# Patient Record
Sex: FEMALE | Race: WHITE | NOT HISPANIC OR LATINO
[De-identification: names, ages, dates, MRNs, and addresses within clinical notes are randomized per-mention and may not be internally consistent; named-entity substitution may affect disease eponyms.]

---

## 2017-01-04 ENCOUNTER — APPOINTMENT (OUTPATIENT)
Dept: ORTHOPEDIC SURGERY | Facility: CLINIC | Age: 47
End: 2017-01-04

## 2017-01-04 VITALS — SYSTOLIC BLOOD PRESSURE: 148 MMHG | HEART RATE: 85 BPM | DIASTOLIC BLOOD PRESSURE: 94 MMHG

## 2017-01-04 VITALS — BODY MASS INDEX: 21.92 KG/M2 | HEIGHT: 69 IN | WEIGHT: 148 LBS

## 2017-01-04 DIAGNOSIS — K52.9 NONINFECTIVE GASTROENTERITIS AND COLITIS, UNSPECIFIED: ICD-10-CM

## 2017-01-04 DIAGNOSIS — Z82.62 FAMILY HISTORY OF OSTEOPOROSIS: ICD-10-CM

## 2017-01-04 DIAGNOSIS — Z56.0 UNEMPLOYMENT, UNSPECIFIED: ICD-10-CM

## 2017-01-04 DIAGNOSIS — Z86.39 PERSONAL HISTORY OF OTHER ENDOCRINE, NUTRITIONAL AND METABOLIC DISEASE: ICD-10-CM

## 2017-01-04 DIAGNOSIS — Z78.9 OTHER SPECIFIED HEALTH STATUS: ICD-10-CM

## 2017-01-04 DIAGNOSIS — Z84.89 FAMILY HISTORY OF OTHER SPECIFIED CONDITIONS: ICD-10-CM

## 2017-01-04 DIAGNOSIS — Z87.39 PERSONAL HISTORY OF OTHER DISEASES OF THE MUSCULOSKELETAL SYSTEM AND CONNECTIVE TISSUE: ICD-10-CM

## 2017-01-04 RX ORDER — LAMOTRIGINE 25 MG/1
TABLET ORAL
Refills: 0 | Status: ACTIVE | COMMUNITY

## 2017-01-04 SDOH — ECONOMIC STABILITY - INCOME SECURITY: UNEMPLOYMENT, UNSPECIFIED: Z56.0

## 2018-03-12 ENCOUNTER — APPOINTMENT (OUTPATIENT)
Dept: ORTHOPEDIC SURGERY | Facility: CLINIC | Age: 48
End: 2018-03-12
Payer: COMMERCIAL

## 2018-03-12 DIAGNOSIS — M41.9 SCOLIOSIS, UNSPECIFIED: ICD-10-CM

## 2018-03-12 PROCEDURE — 99214 OFFICE O/P EST MOD 30 MIN: CPT

## 2018-03-12 PROCEDURE — 72082 X-RAY EXAM ENTIRE SPI 2/3 VW: CPT

## 2019-03-13 ENCOUNTER — APPOINTMENT (OUTPATIENT)
Dept: INTERNAL MEDICINE | Facility: CLINIC | Age: 49
End: 2019-03-13

## 2019-11-25 ENCOUNTER — RESULT REVIEW (OUTPATIENT)
Age: 49
End: 2019-11-25

## 2020-02-14 ENCOUNTER — TRANSCRIPTION ENCOUNTER (OUTPATIENT)
Age: 50
End: 2020-02-14

## 2020-02-14 ENCOUNTER — APPOINTMENT (OUTPATIENT)
Dept: ULTRASOUND IMAGING | Facility: CLINIC | Age: 50
End: 2020-02-14

## 2020-12-04 ENCOUNTER — TRANSCRIPTION ENCOUNTER (OUTPATIENT)
Age: 50
End: 2020-12-04

## 2022-06-05 ENCOUNTER — EMERGENCY (EMERGENCY)
Facility: HOSPITAL | Age: 52
LOS: 0 days | Discharge: HOME | End: 2022-06-06
Attending: EMERGENCY MEDICINE
Payer: COMMERCIAL

## 2022-06-05 VITALS — WEIGHT: 225.09 LBS | HEIGHT: 71 IN

## 2022-06-05 VITALS
TEMPERATURE: 99 F | RESPIRATION RATE: 18 BRPM | DIASTOLIC BLOOD PRESSURE: 83 MMHG | SYSTOLIC BLOOD PRESSURE: 121 MMHG | OXYGEN SATURATION: 99 % | HEART RATE: 75 BPM

## 2022-06-05 DIAGNOSIS — S91.011A LACERATION WITHOUT FOREIGN BODY, RIGHT ANKLE, INITIAL ENCOUNTER: ICD-10-CM
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
## 2022-06-05 DIAGNOSIS — S81.811A LACERATION WITHOUT FOREIGN BODY, RIGHT LOWER LEG, INITIAL ENCOUNTER: ICD-10-CM
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
## 2022-06-05 PROCEDURE — 12001 RPR S/N/AX/GEN/TRNK 2.5CM/<: CPT

## 2022-06-05 PROCEDURE — 99283 EMERGENCY DEPT VISIT LOW MDM: CPT | Mod: 25

## 2022-06-05 NOTE — ED STATDOCS - PATIENT PORTAL LINK FT
You can access the FollowMyHealth Patient Portal offered by [facility] by registering at the following website: [email or web address]. By joining [facility]’s FollowMyHealth portal, you will also be able to view your health information using other applications (apps) compatible with our system.

## 2022-06-05 NOTE — ED STATDOCS - SKIN, MLM
skin normal color for race, warm, dry and intact. jagged lac approx 2x2cm to medial R distal lower leg

## 2022-06-05 NOTE — ED STATDOCS - OBJECTIVE STATEMENT
was in garage getting something and chainsaw fell off the wall and fell onto R ankle, p/w lac.  no further injury

## 2022-06-05 NOTE — ED STATDOCS - NSFOLLOWUPINSTRUCTIONS_ED_ALL_ED_FT
Keep wound clean and dry  suture removal in 10 days  any sign of infection return right away    Laceration    WHAT YOU NEED TO KNOW:    A laceration is an injury to the skin and the soft tissue underneath it. Lacerations happen when you are cut or hit by something. They can happen anywhere on the body.     DISCHARGE INSTRUCTIONS:    Return to the emergency department if:     You have heavy bleeding or bleeding that does not stop after 10 minutes of holding firm, direct pressure over the wound.       Your wound opens up.     Contact your healthcare provider if:     You have a fever or chills.       Your laceration is red, warm, or swollen.      You have red streaks on your skin coming from your wound.      You have white or yellow drainage from the wound that smells bad.      You have pain that gets worse, even after treatment.       You have questions or concerns about your condition or care.     Medicines:     Prescription pain medicine may be given. Ask how to take this medicine safely.       Antibiotics help treat or prevent a bacterial infection.       Take your medicine as directed. Contact your healthcare provider if you think your medicine is not helping or if you have side effects. Tell him or her if you are allergic to any medicine. Keep a list of the medicines, vitamins, and herbs you take. Include the amounts, and when and why you take them. Bring the list or the pill bottles to follow-up visits. Carry your medicine list with you in case of an emergency.    Care for your wound as directed:     Do not get your wound wet until your healthcare provider says it is okay. Do not soak your wound in water. Do not go swimming until your healthcare provider says it is okay. Carefully wash the wound with soap and water. Gently pat the area dry or allow it to air dry.       Change your bandages when they get wet, dirty, or after washing. Apply new, clean bandages as directed. Do not apply elastic bandages or tape too tight. Do not put powders or lotions over your incision.       Apply antibiotic ointment as directed. Your healthcare provider may give you antibiotic ointment to put over your wound if you have stitches. If you have strips of tape over your incision, let them dry up and fall off on their own. If they do not fall off within 14 days, gently remove them. If you have glue over your wound, do not remove or pick at it. If your glue comes off, do not replace it with glue that you have at home.       Check your wound every day for signs of infection such as swelling, redness, or pus.     Self-care:     Apply ice on your wound for 15 to 20 minutes every hour or as directed. Use an ice pack, or put crushed ice in a plastic bag. Cover it with a towel. Ice helps prevent tissue damage and decreases swelling and pain.      Use a splint as directed. A splint will decrease movement and stress on your wound. It may help it heal faster. A splint may be used for lacerations over joints or areas of your body that bend. Ask your healthcare provider how to apply and remove a splint.       Decrease scarring of your wound by applying ointments as directed. Do not apply ointments until your healthcare provider says it is okay. You may need to wait until your wound is healed. Ask which ointment to buy and how often to use it. After your wound is healed, use sunscreen over the area when you are out in the sun. You should do this for at least 6 months to 1 year after your injury.     Follow up with your healthcare provider as directed: You may need to follow up in 24 to 48 hours to have your wound checked for infection. You will need to return in 3 to 14 days if you have stitches or staples so they can be removed. Care for your wound as directed to prevent infection and help it heal. Write down your questions so you remember to ask them during your visits.

## 2022-06-05 NOTE — ED STATDOCS - ENMT, MLM
Nasal mucosa clear.  Mouth with normal mucosa  Throat has no vesicles, no oropharyngeal exudates and uvula is midline.
Yes

## 2022-06-06 ENCOUNTER — INPATIENT (INPATIENT)
Facility: HOSPITAL | Age: 52
LOS: 1 days | Discharge: HOME | DRG: 149 | End: 2022-06-08
Attending: HOSPITALIST | Admitting: FAMILY MEDICINE
Payer: COMMERCIAL

## 2022-06-06 ENCOUNTER — TRANSCRIPTION ENCOUNTER (OUTPATIENT)
Age: 52
End: 2022-06-06

## 2022-06-06 VITALS
RESPIRATION RATE: 19 BRPM | TEMPERATURE: 98 F | SYSTOLIC BLOOD PRESSURE: 153 MMHG | HEIGHT: 71 IN | OXYGEN SATURATION: 100 % | DIASTOLIC BLOOD PRESSURE: 101 MMHG | WEIGHT: 160.06 LBS | HEART RATE: 81 BPM

## 2022-06-06 DIAGNOSIS — R27.0 ATAXIA, UNSPECIFIED: ICD-10-CM

## 2022-06-06 DIAGNOSIS — Z87.39 PERSONAL HISTORY OF OTHER DISEASES OF THE MUSCULOSKELETAL SYSTEM AND CONNECTIVE TISSUE: Chronic | ICD-10-CM

## 2022-06-06 LAB
ADD ON TEST-SPECIMEN IN LAB: SIGNIFICANT CHANGE UP
ALBUMIN SERPL ELPH-MCNC: 3.5 G/DL — SIGNIFICANT CHANGE UP (ref 3.3–5)
ALP SERPL-CCNC: 88 U/L — SIGNIFICANT CHANGE UP (ref 40–120)
ALT FLD-CCNC: 37 U/L — SIGNIFICANT CHANGE UP (ref 12–78)
ANION GAP SERPL CALC-SCNC: 8 MMOL/L — SIGNIFICANT CHANGE UP (ref 5–17)
APAP SERPL-MCNC: <2 UG/ML — LOW (ref 10–30)
APPEARANCE UR: CLEAR — SIGNIFICANT CHANGE UP
AST SERPL-CCNC: 24 U/L — SIGNIFICANT CHANGE UP (ref 15–37)
BASOPHILS # BLD AUTO: 0.02 K/UL — SIGNIFICANT CHANGE UP (ref 0–0.2)
BASOPHILS NFR BLD AUTO: 0.3 % — SIGNIFICANT CHANGE UP (ref 0–2)
BILIRUB SERPL-MCNC: 0.3 MG/DL — SIGNIFICANT CHANGE UP (ref 0.2–1.2)
BILIRUB UR-MCNC: NEGATIVE — SIGNIFICANT CHANGE UP
BUN SERPL-MCNC: 25 MG/DL — HIGH (ref 7–23)
CALCIUM SERPL-MCNC: 9.8 MG/DL — SIGNIFICANT CHANGE UP (ref 8.5–10.1)
CHLORIDE SERPL-SCNC: 109 MMOL/L — HIGH (ref 96–108)
CO2 SERPL-SCNC: 24 MMOL/L — SIGNIFICANT CHANGE UP (ref 22–31)
COLOR SPEC: YELLOW — SIGNIFICANT CHANGE UP
CREAT SERPL-MCNC: 0.94 MG/DL — SIGNIFICANT CHANGE UP (ref 0.5–1.3)
DIFF PNL FLD: NEGATIVE — SIGNIFICANT CHANGE UP
EGFR: 73 ML/MIN/1.73M2 — SIGNIFICANT CHANGE UP
EOSINOPHIL # BLD AUTO: 0.08 K/UL — SIGNIFICANT CHANGE UP (ref 0–0.5)
EOSINOPHIL NFR BLD AUTO: 1.1 % — SIGNIFICANT CHANGE UP (ref 0–6)
ETHANOL SERPL-MCNC: <10 MG/DL — SIGNIFICANT CHANGE UP (ref 0–10)
GLUCOSE SERPL-MCNC: 103 MG/DL — HIGH (ref 70–99)
GLUCOSE UR QL: NEGATIVE — SIGNIFICANT CHANGE UP
HCT VFR BLD CALC: 37.2 % — SIGNIFICANT CHANGE UP (ref 34.5–45)
HGB BLD-MCNC: 11.9 G/DL — SIGNIFICANT CHANGE UP (ref 11.5–15.5)
IMM GRANULOCYTES NFR BLD AUTO: 0.3 % — SIGNIFICANT CHANGE UP (ref 0–1.5)
KETONES UR-MCNC: NEGATIVE — SIGNIFICANT CHANGE UP
LEUKOCYTE ESTERASE UR-ACNC: NEGATIVE — SIGNIFICANT CHANGE UP
LYMPHOCYTES # BLD AUTO: 1.26 K/UL — SIGNIFICANT CHANGE UP (ref 1–3.3)
LYMPHOCYTES # BLD AUTO: 18 % — SIGNIFICANT CHANGE UP (ref 13–44)
MCHC RBC-ENTMCNC: 30.2 PG — SIGNIFICANT CHANGE UP (ref 27–34)
MCHC RBC-ENTMCNC: 32 GM/DL — SIGNIFICANT CHANGE UP (ref 32–36)
MCV RBC AUTO: 94.4 FL — SIGNIFICANT CHANGE UP (ref 80–100)
MONOCYTES # BLD AUTO: 0.43 K/UL — SIGNIFICANT CHANGE UP (ref 0–0.9)
MONOCYTES NFR BLD AUTO: 6.1 % — SIGNIFICANT CHANGE UP (ref 2–14)
NEUTROPHILS # BLD AUTO: 5.2 K/UL — SIGNIFICANT CHANGE UP (ref 1.8–7.4)
NEUTROPHILS NFR BLD AUTO: 74.2 % — SIGNIFICANT CHANGE UP (ref 43–77)
NITRITE UR-MCNC: NEGATIVE — SIGNIFICANT CHANGE UP
PCP SPEC-MCNC: SIGNIFICANT CHANGE UP
PH UR: 5 — SIGNIFICANT CHANGE UP (ref 5–8)
PLATELET # BLD AUTO: 326 K/UL — SIGNIFICANT CHANGE UP (ref 150–400)
POTASSIUM SERPL-MCNC: 4.4 MMOL/L — SIGNIFICANT CHANGE UP (ref 3.5–5.3)
POTASSIUM SERPL-SCNC: 4.4 MMOL/L — SIGNIFICANT CHANGE UP (ref 3.5–5.3)
PROT SERPL-MCNC: 7.4 GM/DL — SIGNIFICANT CHANGE UP (ref 6–8.3)
PROT UR-MCNC: NEGATIVE — SIGNIFICANT CHANGE UP
RBC # BLD: 3.94 M/UL — SIGNIFICANT CHANGE UP (ref 3.8–5.2)
RBC # FLD: 14 % — SIGNIFICANT CHANGE UP (ref 10.3–14.5)
SALICYLATES SERPL-MCNC: <1.7 MG/DL — LOW (ref 2.8–20)
SARS-COV-2 RNA SPEC QL NAA+PROBE: SIGNIFICANT CHANGE UP
SODIUM SERPL-SCNC: 141 MMOL/L — SIGNIFICANT CHANGE UP (ref 135–145)
SP GR SPEC: 1.01 — SIGNIFICANT CHANGE UP (ref 1.01–1.02)
UROBILINOGEN FLD QL: NEGATIVE — SIGNIFICANT CHANGE UP
WBC # BLD: 7.01 K/UL — SIGNIFICANT CHANGE UP (ref 3.8–10.5)
WBC # FLD AUTO: 7.01 K/UL — SIGNIFICANT CHANGE UP (ref 3.8–10.5)

## 2022-06-06 PROCEDURE — 99285 EMERGENCY DEPT VISIT HI MDM: CPT

## 2022-06-06 PROCEDURE — 83036 HEMOGLOBIN GLYCOSYLATED A1C: CPT

## 2022-06-06 PROCEDURE — 93010 ELECTROCARDIOGRAM REPORT: CPT

## 2022-06-06 PROCEDURE — 84702 CHORIONIC GONADOTROPIN TEST: CPT

## 2022-06-06 PROCEDURE — 70551 MRI BRAIN STEM W/O DYE: CPT

## 2022-06-06 PROCEDURE — 70498 CT ANGIOGRAPHY NECK: CPT | Mod: 26,MA

## 2022-06-06 PROCEDURE — 71045 X-RAY EXAM CHEST 1 VIEW: CPT | Mod: 26

## 2022-06-06 PROCEDURE — 85652 RBC SED RATE AUTOMATED: CPT

## 2022-06-06 PROCEDURE — 80061 LIPID PANEL: CPT

## 2022-06-06 PROCEDURE — 70496 CT ANGIOGRAPHY HEAD: CPT | Mod: 26,MA

## 2022-06-06 PROCEDURE — 80048 BASIC METABOLIC PNL TOTAL CA: CPT

## 2022-06-06 PROCEDURE — 99223 1ST HOSP IP/OBS HIGH 75: CPT

## 2022-06-06 PROCEDURE — 71045 X-RAY EXAM CHEST 1 VIEW: CPT

## 2022-06-06 PROCEDURE — 86140 C-REACTIVE PROTEIN: CPT

## 2022-06-06 PROCEDURE — 97163 PT EVAL HIGH COMPLEX 45 MIN: CPT | Mod: GP

## 2022-06-06 PROCEDURE — 97116 GAIT TRAINING THERAPY: CPT | Mod: GP

## 2022-06-06 PROCEDURE — 70450 CT HEAD/BRAIN W/O DYE: CPT | Mod: 26,MA,59

## 2022-06-06 PROCEDURE — 36415 COLL VENOUS BLD VENIPUNCTURE: CPT

## 2022-06-06 RX ORDER — VORTIOXETINE 5 MG/1
1 TABLET, FILM COATED ORAL
Qty: 0 | Refills: 0 | DISCHARGE

## 2022-06-06 RX ORDER — LANOLIN ALCOHOL/MO/W.PET/CERES
1 CREAM (GRAM) TOPICAL
Qty: 0 | Refills: 0 | DISCHARGE

## 2022-06-06 RX ORDER — ACEBUTOLOL HCL 200 MG
1 CAPSULE ORAL
Qty: 0 | Refills: 0 | DISCHARGE

## 2022-06-06 RX ORDER — LANOLIN ALCOHOL/MO/W.PET/CERES
10 CREAM (GRAM) TOPICAL AT BEDTIME
Refills: 0 | Status: DISCONTINUED | OUTPATIENT
Start: 2022-06-06 | End: 2022-06-08

## 2022-06-06 RX ORDER — HYDROXYZINE HCL 10 MG
2 TABLET ORAL
Qty: 0 | Refills: 0 | DISCHARGE

## 2022-06-06 RX ORDER — CLONAZEPAM 1 MG
1 TABLET ORAL
Qty: 0 | Refills: 0 | DISCHARGE

## 2022-06-06 RX ORDER — ASPIRIN/CALCIUM CARB/MAGNESIUM 324 MG
325 TABLET ORAL ONCE
Refills: 0 | Status: COMPLETED | OUTPATIENT
Start: 2022-06-06 | End: 2022-06-06

## 2022-06-06 RX ORDER — ZOLPIDEM TARTRATE 10 MG/1
5 TABLET ORAL AT BEDTIME
Refills: 0 | Status: DISCONTINUED | OUTPATIENT
Start: 2022-06-06 | End: 2022-06-08

## 2022-06-06 RX ORDER — QUETIAPINE FUMARATE 200 MG/1
50 TABLET, FILM COATED ORAL AT BEDTIME
Refills: 0 | Status: DISCONTINUED | OUTPATIENT
Start: 2022-06-06 | End: 2022-06-08

## 2022-06-06 RX ORDER — CLONAZEPAM 1 MG
0.5 TABLET ORAL
Refills: 0 | Status: DISCONTINUED | OUTPATIENT
Start: 2022-06-06 | End: 2022-06-08

## 2022-06-06 RX ORDER — ENOXAPARIN SODIUM 100 MG/ML
30 INJECTION SUBCUTANEOUS EVERY 24 HOURS
Refills: 0 | Status: DISCONTINUED | OUTPATIENT
Start: 2022-06-06 | End: 2022-06-07

## 2022-06-06 RX ORDER — CLONAZEPAM 1 MG
2 TABLET ORAL
Qty: 0 | Refills: 0 | DISCHARGE

## 2022-06-06 RX ORDER — NICOTINE POLACRILEX 2 MG
1 GUM BUCCAL DAILY
Refills: 0 | Status: DISCONTINUED | OUTPATIENT
Start: 2022-06-06 | End: 2022-06-08

## 2022-06-06 RX ORDER — ASPIRIN/CALCIUM CARB/MAGNESIUM 324 MG
81 TABLET ORAL DAILY
Refills: 0 | Status: DISCONTINUED | OUTPATIENT
Start: 2022-06-07 | End: 2022-06-08

## 2022-06-06 RX ORDER — ZOLPIDEM TARTRATE 10 MG/1
1 TABLET ORAL
Qty: 0 | Refills: 0 | DISCHARGE

## 2022-06-06 RX ORDER — ACEBUTOLOL HCL 200 MG
200 CAPSULE ORAL AT BEDTIME
Refills: 0 | Status: DISCONTINUED | OUTPATIENT
Start: 2022-06-06 | End: 2022-06-08

## 2022-06-06 RX ORDER — LAMOTRIGINE 25 MG/1
200 TABLET, ORALLY DISINTEGRATING ORAL AT BEDTIME
Refills: 0 | Status: DISCONTINUED | OUTPATIENT
Start: 2022-06-06 | End: 2022-06-07

## 2022-06-06 RX ORDER — ROSUVASTATIN CALCIUM 5 MG/1
1 TABLET ORAL
Qty: 0 | Refills: 0 | DISCHARGE

## 2022-06-06 RX ORDER — SODIUM CHLORIDE 9 MG/ML
1000 INJECTION INTRAMUSCULAR; INTRAVENOUS; SUBCUTANEOUS ONCE
Refills: 0 | Status: COMPLETED | OUTPATIENT
Start: 2022-06-06 | End: 2022-06-06

## 2022-06-06 RX ORDER — ATORVASTATIN CALCIUM 80 MG/1
80 TABLET, FILM COATED ORAL AT BEDTIME
Refills: 0 | Status: DISCONTINUED | OUTPATIENT
Start: 2022-06-06 | End: 2022-06-08

## 2022-06-06 RX ORDER — LAMOTRIGINE 25 MG/1
2 TABLET, ORALLY DISINTEGRATING ORAL
Qty: 0 | Refills: 0 | DISCHARGE

## 2022-06-06 RX ORDER — CLONAZEPAM 1 MG
1 TABLET ORAL AT BEDTIME
Refills: 0 | Status: DISCONTINUED | OUTPATIENT
Start: 2022-06-06 | End: 2022-06-08

## 2022-06-06 RX ORDER — HYDROXYZINE HCL 10 MG
25 TABLET ORAL DAILY
Refills: 0 | Status: DISCONTINUED | OUTPATIENT
Start: 2022-06-06 | End: 2022-06-08

## 2022-06-06 RX ADMIN — LAMOTRIGINE 200 MILLIGRAM(S): 25 TABLET, ORALLY DISINTEGRATING ORAL at 23:07

## 2022-06-06 RX ADMIN — Medication 325 MILLIGRAM(S): at 18:12

## 2022-06-06 RX ADMIN — QUETIAPINE FUMARATE 50 MILLIGRAM(S): 200 TABLET, FILM COATED ORAL at 23:29

## 2022-06-06 RX ADMIN — Medication 1 MILLIGRAM(S): at 23:07

## 2022-06-06 RX ADMIN — Medication 10 MILLIGRAM(S): at 23:07

## 2022-06-06 RX ADMIN — ENOXAPARIN SODIUM 30 MILLIGRAM(S): 100 INJECTION SUBCUTANEOUS at 23:29

## 2022-06-06 RX ADMIN — Medication 1 TABLET(S): at 23:07

## 2022-06-06 RX ADMIN — SODIUM CHLORIDE 1000 MILLILITER(S): 9 INJECTION INTRAMUSCULAR; INTRAVENOUS; SUBCUTANEOUS at 15:41

## 2022-06-06 NOTE — PATIENT PROFILE ADULT - FUNCTIONAL ASSESSMENT - DAILY ACTIVITY SCORE.
Principal Discharge DX:	Hip fracture requiring operative repair, right, closed, initial encounter  Goal:	Restore function  Assessment and plan of treatment:	Your Physical Therapy /Occupational Therapy will include Ambulation, Transfers , Stairs, ADLs (activities of daily living), range of motion, and isometrics.  Your participation is vital for the fullest recovery and best results.  You may bear full weight as tolerated with rolling walker, cane or assistive device.     HIP PRECAUTIONS   Do not bend your hip more than 90 degrees.   Do not rotate your leg drastically inward.   Continue abduction pillow while in bed.   Sit in Hip Chair or a chair that does not allow your to bend more than 90 degrees.  Do not sit on low chairs or low toilet seats.  Do not take a tub bath yet.   Do not resume driving until you have your surgeon’s permission.     Keep incision clean and dry.  Change the dressing daily if there is drainage noted.  When there is no drainage the wound may be open to air.   The wound is closed with staples, which should be removed 2 weeks after surgery at rehab facility or in surgeon's office.  If there is no wet drainage you may shower and pat dry with a clean towel. Principal Discharge DX:	Hip fracture requiring operative repair, right, closed, initial encounter  Goal:	Restore function  Assessment and plan of treatment:	Your Physical Therapy /Occupational Therapy will include Ambulation, Transfers , Stairs, ADLs (activities of daily living), range of motion, and isometrics.  Your participation is vital for the fullest recovery and best results.  You may bear full weight as tolerated with rolling walker, cane or assistive device.     HIP PRECAUTIONS   Do not bend your hip more than 90 degrees.   Do not rotate your leg drastically inward.   Continue abduction pillow while in bed.   Sit in Hip Chair or a chair that does not allow your to bend more than 90 degrees.  Do not sit on low chairs or low toilet seats.  Do not take a tub bath yet.   Do not resume driving until you have your surgeon’s permission.     Keep incision clean and dry.  Change the dressing daily if there is drainage noted.  When there is no drainage the wound may be open to air.   The wound is closed with staples, which should be removed 2 weeks after surgery at rehab facility or in surgeon's office.  If there is no wet drainage you may shower and pat dry with a clean towel.  Secondary Diagnosis:	Acute cystitis without hematuria  Assessment and plan of treatment:	esbl ecoli.   invanz 1gm iv x7 days started jan 23  Secondary Diagnosis:	Urine retention  Assessment and plan of treatment:	failed TOV, probably retry next week. 23

## 2022-06-06 NOTE — H&P ADULT - HISTORY OF PRESENT ILLNESS
53 y/o female with a PMHx of bipolar, depression, presents to the ED c/o generalized weakness, difficulty walking. Pt was seen in ED last night for ankle laceration. Pt was here until 1230 AM.  States she went to sleep around 1 AM. Pt slept until 9 AM, woke up with sensation of generalized weakness and difficulty ambulating secondary to weakness. States she also had an episode of vomiting today.  States she attempted to go back to sleep, and woke up later and still had difficulty standing/walking.  Pt sates that her father passed away 6 months ago and she was cleaning his house yesterday. Pt took her prescription Lamictal today. Denies SI/HI, hallucinations. No EtOH use. No drug use. Pt is a pleasant 53 y/o female with a PMHx of Bipolar depression,  Sleep Disorder, Sinus Arrythmia with PVCs, Collagenous microscopic colitis and HLA B27,   Scoliosis, Osteoporosis who  presented to  [facility] ED c/o generalized weakness, and difficulty walking since 9am today.  Notably, pt  was seen at [facility] ED last night for ankle laceration while she was cleaning out her recently  father's garage.  Last night,  pt was here until 1230 AM and reported  she went to sleep around 1 AM.    She woke up at 9am and reported a  generalized weakness of her limbs with associated difficulty ambulating.    She reports she had to slowly crawl from bedpost to the bathroom and back in order to keep from falling.   She c/o dizziness and feeling as though she was "spinning".   Pt reports a similar episode occurred 2 months ago and self resolved after rest and sleep.    This time though she tried to go back to sleep.  But when she  woke  later, she still had difficulty standing/walking.  She reports inability to think clearly and in the ED,  pt was notably very tearful  and weeping when evaluated by ED staff.  Pt denied SI/HI, no hallucinations.      Pt c/o HA which is all over her head but reportedly more in the front part of her head.  In the ED her symptoms of weakness improved enough that she was able to sit up and  eat a late dinner.  She denies chest pain, palpitations.   No abd pain or resp complaints.  She had some loose stools yesterday which she reports as chronic and intermittent,   she also had an episode of vomiting today.   No urinary complaints.    She vapes several times a day and says it helps her with her chronic colitis.

## 2022-06-06 NOTE — H&P ADULT - NSHPPHYSICALEXAM_GEN_ALL_CORE
ICU Vital Signs Last 24 Hrs  T(C): 36.9 (06 Jun 2022 20:30), Max: 37 (05 Jun 2022 22:38)  T(F): 98.4 (06 Jun 2022 20:30), Max: 98.6 (05 Jun 2022 22:38)  HR: 74 (06 Jun 2022 20:30) (70 - 81)  BP: 134/74 (06 Jun 2022 20:30) (121/83 - 154/90)  BP(mean): 109 (06 Jun 2022 15:27) (95 - 109)    RR: 12 (06 Jun 2022 20:30) (12 - 19)  SpO2: 98% (06 Jun 2022 20:30) (97% - 100%)

## 2022-06-06 NOTE — ED ADULT NURSE NOTE - OBJECTIVE STATEMENT
pt presents to er for evaluation of weakness. pt anxious and tearful upon arrival, stating "my father  6months ago. I went to his house yesterday to clean it out and it's been extremely emotional i'm so sad. I could barely get out of bed today". pt denies SI/HI. hx depression. no CP, fevers, SOB, urinary symptoms.

## 2022-06-06 NOTE — H&P ADULT - NSICDXPASTMEDICALHX_GEN_ALL_CORE_FT
PAST MEDICAL HISTORY:  Bipolar disorder     Depression     Scoliosis      PAST MEDICAL HISTORY:  Bipolar disorder     Collagenous colitis     Depression     HLA B27 positive     Osteoporosis     Scoliosis

## 2022-06-06 NOTE — H&P ADULT - NSICDXFAMILYHX_GEN_ALL_CORE_FT
FAMILY HISTORY:  Family history of autoimmune disorder, HLA-B27    Mother  Still living? Unknown  Family history of myocardial infarction, Age at diagnosis: Age Unknown

## 2022-06-06 NOTE — ED PROVIDER NOTE - CONSTITUTIONAL, MLM
Tearful, awake, alert, oriented to person, place, time/situation and in no apparent distress. normal...

## 2022-06-06 NOTE — ED PROVIDER NOTE - CROS ED ROS STATEMENT
Post-Care Instructions: I reviewed with the patient in detail post-care instructions. Patient is to wear sunprotection, and avoid picking at any of the treated lesions. Pt may apply Vaseline to crusted or scabbing areas.
Detail Level: Zone
Total Number Of Lesions Treated: 26
Price (Use Numbers Only, No Special Characters Or $): 150.00
Duration Of Freeze Thaw-Cycle (Seconds): 0
Render Post Care In The Note?: yes
Consent: The patient's consent was obtained including but not limited to risks of crusting, scabbing, blistering, scarring, darker or lighter pigmentary change, recurrence, incomplete removal and infection.
all other ROS negative except as per HPI

## 2022-06-06 NOTE — ED PROVIDER NOTE - PROGRESS NOTE DETAILS
pt here with c/c of overslept, very emotional, crying at bedside b/c she is selling her Dad's place this week and she was there yesterday cleaning it up. pt denies any SI/HI. PE: wnl plan: labs, tox screen, ekg, cardiac monitor and reeval. -Ivett Villeda PA-C Although exam in bed unremarkable, nurse got patient up to walk and patient dystaxic.  Last known normal 1 AM today, outside of tPA window.  Negative workup so far, (patient does take Lamictal, possible medication side effect, however lamictal level is send out).  Adding CTA to r/o LVO.  Likely admit for difficulty ambulating. Andrés Keller D.O.

## 2022-06-06 NOTE — ED ADULT TRIAGE NOTE - CHIEF COMPLAINT QUOTE
Patient presents with  via EMS. Patient states they were here this morning until 2 am getting suture repair. Patient woke up this morning with generalized weakness. Can move all extremities. Patient tearful at triage.

## 2022-06-06 NOTE — H&P ADULT - REASON FOR ADMISSION
Possible CVA/TIA    Headache   Ataxia/gait disorder  and Generalized weakness  Possible medication toxicity

## 2022-06-06 NOTE — ED PROVIDER NOTE - NEUROLOGICAL, MLM
Alert and oriented, no focal deficits, no motor or sensory deficits. Alert and oriented, no focal deficits, no motor or sensory deficits; no drift, no dysarthria or dysmetria; does have ataxic gait.  NIH 2 for ataxia.

## 2022-06-06 NOTE — ED ADULT NURSE REASSESSMENT NOTE - NS ED NURSE REASSESS COMMENT FT1
pt ambulated to bathroom with 1 assist, still unsteady but walked much better than previously as per  @bedside.  no abnormalities on tele monitor, awaiting inpatient room upstairs.

## 2022-06-06 NOTE — ED PROVIDER NOTE - OBJECTIVE STATEMENT
53 y/o female with a PMHx of bipolar, depression, presents to the ED c/o generalized weakness. Pt was seen in ED last night for ankle laceration. Pt was here until 2am. Pt slept until noon today. Pt with generalized weakness and difficulty ambulating secondary to weakness. Pt sates that her father passed away 6 months ago and she was cleaning his house yesterday. Pt took her prescription Lamictal today. Denies SI/HI, hallucinations. No EtOH use. No drug use. No other complaints at this time. 53 y/o female with a PMHx of bipolar, depression, presents to the ED c/o generalized weakness, difficulty walking. Pt was seen in ED last night for ankle laceration. Pt was here until 1230 AM.  States she went to sleep around 1 AM. Pt slept until 9 AM, woke up with sensation of generalized weakness and difficulty ambulating secondary to weakness. States she also had an episode of vomiting today.  States she attempted to go back to sleep, and woke up later and still had difficulty standing/walking.  Pt sates that her father passed away 6 months ago and she was cleaning his house yesterday. Pt took her prescription Lamictal today. Denies SI/HI, hallucinations. No EtOH use. No drug use. No other complaints at this time.

## 2022-06-06 NOTE — H&P ADULT - ASSESSMENT
Pt is admitted w/    Possible CVA/TIA  . NIHSS:0  Headache   Ataxia/gait disorder  and Generalized weakness  Possible medication toxicity  Hx of Bipolar Depression  Bereavement,   Hx of Collagenous microscopic colitis and HLA B27  Hx of Scoliosis,  pt has been sleeping in a lounge chair for 2 years  Vaping Hx,   - s/p CT brain  - s/p ASA 325mg in the ED  - Lamictal level pending  - s/p 1 L NS in the ED  - continue Lamictal, seroquel and Trintilix for now  - MRI brain  - Neurology and Psychiatry consults   - Falls risk  - physical therapy  - smoking cessation,  low dose nicotine patch trial  - DVT proph: lovenox  - Adv Dir: Full Code Pt is admitted w/    Possible CVA/TIA  . NIHSS:0  Headache   Ataxia/gait disorder  and Generalized weakness  Possible medication toxicity  Hx of Bipolar Depression  Bereavement,   Hx of Collagenous microscopic colitis and HLA B27  Hx of Scoliosis,  pt has been sleeping in a lounge chair for 2 years  Vaping Hx,   - s/p CT brain  - s/p ASA 325mg in the ED  - Lamictal level pending  - admit to telemetry  - s/p 1 L NS in the ED  - continue Lamictal, seroquel and Trintilix for now  - Neurochecks  - MRI brain  - Neurology and Psychiatry consults   - Falls risk  - physical therapy  - smoking cessation,  low dose nicotine patch trial  - DVT proph: lovenox  - Adv Dir: Full Code

## 2022-06-06 NOTE — PATIENT PROFILE ADULT - FALL HARM RISK - HARM RISK INTERVENTIONS
Assistance with ambulation/Assistance OOB with selected safe patient handling equipment/Communicate Risk of Fall with Harm to all staff/Discuss with provider need for PT consult/Monitor gait and stability/Reinforce activity limits and safety measures with patient and family/Sit up slowly, dangle for a short time, stand at bedside before walking/Tailored Fall Risk Interventions/Visual Cue: Yellow wristband and red socks/Bed in lowest position, wheels locked, appropriate side rails in place/Call bell, personal items and telephone in reach/Instruct patient to call for assistance before getting out of bed or chair/Non-slip footwear when patient is out of bed/[place] to call system/Physically safe environment - no spills, clutter or unnecessary equipment/Purposeful Proactive Rounding/Room/bathroom lighting operational, light cord in reach

## 2022-06-06 NOTE — H&P ADULT - NSHPSOCIALHISTORY_GEN_ALL_CORE
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  She lives with her  and grown daughter.     Pt vapes several times a day and says it helps her with her chronic colitis.  She smoked 1.5 PPD until age 46, with a > 25 pack year hx.       No EtOH use. No drug use.

## 2022-06-07 LAB
A1C WITH ESTIMATED AVERAGE GLUCOSE RESULT: 5.9 % — HIGH (ref 4–5.6)
ANION GAP SERPL CALC-SCNC: 5 MMOL/L — SIGNIFICANT CHANGE UP (ref 5–17)
BUN SERPL-MCNC: 35 MG/DL — HIGH (ref 7–23)
CALCIUM SERPL-MCNC: 9.1 MG/DL — SIGNIFICANT CHANGE UP (ref 8.5–10.1)
CHLORIDE SERPL-SCNC: 113 MMOL/L — HIGH (ref 96–108)
CHOLEST SERPL-MCNC: 185 MG/DL — SIGNIFICANT CHANGE UP
CO2 SERPL-SCNC: 23 MMOL/L — SIGNIFICANT CHANGE UP (ref 22–31)
CREAT SERPL-MCNC: 0.9 MG/DL — SIGNIFICANT CHANGE UP (ref 0.5–1.3)
CRP SERPL-MCNC: 15 MG/L — HIGH
EGFR: 77 ML/MIN/1.73M2 — SIGNIFICANT CHANGE UP
ERYTHROCYTE [SEDIMENTATION RATE] IN BLOOD: 33 MM/HR — HIGH (ref 0–20)
ESTIMATED AVERAGE GLUCOSE: 123 MG/DL — HIGH (ref 68–114)
GLUCOSE SERPL-MCNC: 100 MG/DL — HIGH (ref 70–99)
HDLC SERPL-MCNC: 52 MG/DL — SIGNIFICANT CHANGE UP
LIPID PNL WITH DIRECT LDL SERPL: 105 MG/DL — HIGH
NON HDL CHOLESTEROL: 133 MG/DL — HIGH
POTASSIUM SERPL-MCNC: 4.4 MMOL/L — SIGNIFICANT CHANGE UP (ref 3.5–5.3)
POTASSIUM SERPL-SCNC: 4.4 MMOL/L — SIGNIFICANT CHANGE UP (ref 3.5–5.3)
SODIUM SERPL-SCNC: 141 MMOL/L — SIGNIFICANT CHANGE UP (ref 135–145)
TRIGL SERPL-MCNC: 140 MG/DL — SIGNIFICANT CHANGE UP

## 2022-06-07 PROCEDURE — 99221 1ST HOSP IP/OBS SF/LOW 40: CPT

## 2022-06-07 PROCEDURE — 99232 SBSQ HOSP IP/OBS MODERATE 35: CPT

## 2022-06-07 PROCEDURE — 70551 MRI BRAIN STEM W/O DYE: CPT | Mod: 26

## 2022-06-07 PROCEDURE — 99223 1ST HOSP IP/OBS HIGH 75: CPT

## 2022-06-07 RX ORDER — SODIUM CHLORIDE 9 MG/ML
1000 INJECTION INTRAMUSCULAR; INTRAVENOUS; SUBCUTANEOUS
Refills: 0 | Status: DISCONTINUED | OUTPATIENT
Start: 2022-06-07 | End: 2022-06-08

## 2022-06-07 RX ORDER — CHLORHEXIDINE GLUCONATE 213 G/1000ML
1 SOLUTION TOPICAL
Refills: 0 | Status: DISCONTINUED | OUTPATIENT
Start: 2022-06-07 | End: 2022-06-08

## 2022-06-07 RX ORDER — VORTIOXETINE 5 MG/1
15 TABLET, FILM COATED ORAL DAILY
Refills: 0 | Status: DISCONTINUED | OUTPATIENT
Start: 2022-06-07 | End: 2022-06-08

## 2022-06-07 RX ORDER — ENOXAPARIN SODIUM 100 MG/ML
40 INJECTION SUBCUTANEOUS EVERY 24 HOURS
Refills: 0 | Status: DISCONTINUED | OUTPATIENT
Start: 2022-06-07 | End: 2022-06-08

## 2022-06-07 RX ORDER — LAMOTRIGINE 25 MG/1
400 TABLET, ORALLY DISINTEGRATING ORAL AT BEDTIME
Refills: 0 | Status: DISCONTINUED | OUTPATIENT
Start: 2022-06-07 | End: 2022-06-08

## 2022-06-07 RX ADMIN — Medication 1 MILLIGRAM(S): at 21:51

## 2022-06-07 RX ADMIN — Medication 81 MILLIGRAM(S): at 09:06

## 2022-06-07 RX ADMIN — VORTIOXETINE 15 MILLIGRAM(S): 5 TABLET, FILM COATED ORAL at 09:07

## 2022-06-07 RX ADMIN — SODIUM CHLORIDE 75 MILLILITER(S): 9 INJECTION INTRAMUSCULAR; INTRAVENOUS; SUBCUTANEOUS at 15:25

## 2022-06-07 RX ADMIN — LAMOTRIGINE 400 MILLIGRAM(S): 25 TABLET, ORALLY DISINTEGRATING ORAL at 21:50

## 2022-06-07 RX ADMIN — Medication 1 TABLET(S): at 09:07

## 2022-06-07 RX ADMIN — Medication 10 MILLIGRAM(S): at 21:50

## 2022-06-07 RX ADMIN — ATORVASTATIN CALCIUM 80 MILLIGRAM(S): 80 TABLET, FILM COATED ORAL at 21:50

## 2022-06-07 RX ADMIN — ENOXAPARIN SODIUM 40 MILLIGRAM(S): 100 INJECTION SUBCUTANEOUS at 21:51

## 2022-06-07 NOTE — BH CONSULTATION LIAISON ASSESSMENT NOTE - SUMMARY
Patient a 51 y/o  female, domiciled with family, hx of Bipolar D/O for years, no prior Psychiatric Hospitalization, no prior SI/SA, no drug abuse hx, medically has Scoliosis; Colitis and admitted due to Ataxia and slurry speech.    Patient in bed, AAOX3, endorses that her father  6 months back and has been tearful and meds does not seem to help her grief so she decoded to vape daily, and has been doing for past 6 months. She has good sleep/appetite, she had a similar episode 6 months earlier and was cleared after bed rest, but this time it seems somewhat prolonged so she decided to come to Hospital for clarification. She is under care of psychiatrist Dr. Anne Fields for years and takes Lamictal 400 mg/daily, with Trintellix 15 mg daily and Klonopin 1 mg HS with Melatonin to help with sleep. She has been omn this meds regime for years and has been doing very well except her father's death 6 moths back and since then has been vaping daily to help deal with her loss. She ah good sleep/appetite, no prior por current SI/SA, no drug abuse hx., works in School with increase physical activity as well.    Plan: 1. To continue current psych meds as given           2. Improving as time progressing with strength/improved memory and gait issues           3. Pschy F/U PRN

## 2022-06-07 NOTE — PROGRESS NOTE ADULT - SUBJECTIVE AND OBJECTIVE BOX
Chief Complaint: headache     Interval Events:      All other review of systems is negative unless indicated above    Physical Exam:  T(C): 36.8 (07 Jun 2022 07:32), Max: 37.5 (07 Jun 2022 04:16)  T(F): 98.2 (07 Jun 2022 07:32), Max: 99.5 (07 Jun 2022 04:16)  HR: 65 (07 Jun 2022 07:32) (65 - 81)  BP: 118/67 (07 Jun 2022 07:32) (118/67 - 154/90)  BP(mean): 109 (06 Jun 2022 15:27) (109 - 109)  RR: 16 (07 Jun 2022 07:32) (12 - 19)  SpO2: 97% (07 Jun 2022 07:32) (94% - 100%)    Constitutional: NAD, awake and alert  HEENT: PERR, EOMI, Normal Hearing, MMM  Neck: Soft and supple, No LAD, No JVD  Respiratory: Breath sounds are clear bilaterally, No wheezing, rales or rhonchi  Cardiovascular: S1 and S2, regular rate and rhythm, no Murmurs, gallops or rubs  Gastrointestinal: Bowel Sounds present, soft, nontender, nondistended, no guarding, no rebound  Extremities: No peripheral edema  Vascular: 2+ peripheral pulses  Neurological: A/O x 3, no focal deficits  Musculoskeletal: 5/5 strength b/l upper and lower extremities  Skin: No rashes    Labs:                        11.9   7.01  )-----------( 326      ( 06 Jun 2022 15:16 )             37.2     06-07    141  |  113<H>  |  35<H>  ----------------------------<  100<H>  4.4   |  23  |  0.90    Ca    9.1      07 Jun 2022 07:19    TPro  7.4  /  Alb  3.5  /  TBili  0.3  /  DBili  x   /  AST  24  /  ALT  37  /  AlkPhos  88  06-06    CARDIAC MARKERS ( 06 Jun 2022 15:16 )  x     / x     / 171 U/L / x     / x      ESR 33    HCG 3        Trop (-)     Micro:    UA (-)  U Tox (-)  Salicylate/ Acetaminophen Serum (-)  BAL (-)  COVID PCR (-)     Radiology:    CT Head No Cont: 6/6/22: No acute intracranial hemorrhage, brain edema, or mass effect. No displaced calvarial fracture.    CT Angio Head w/ IV Cont : 6/6/22:  1.   Right carotid system:  No hemodynamically significant stenosis.  2.   Left carotid system:  No hemodynamically significant stenosis. 3.   Intracranial circulation: No hemodynamically significant stenosis. No significant vascular lesion.    Cardiac Testing:    TTE pending     Medications:  aspirin enteric coated 81 milliGRAM(s) Oral daily  atorvastatin 80 milliGRAM(s) Oral at bedtime  chlorhexidine 4% Liquid 1 Application(s) Topical <User Schedule>  clonazePAM  Tablet 1 milliGRAM(s) Oral at bedtime  enoxaparin Injectable 40 milliGRAM(s) SubCutaneous every 24 hours  lamoTRIgine 200 milliGRAM(s) Oral at bedtime  melatonin 10 milliGRAM(s) Oral at bedtime  multivitamin 1 Tablet(s) Oral daily  nicotine -   7 mG/24Hr(s) Patch 1 Patch Transdermal daily  vortioxetine 15 milliGRAM(s) Oral daily    MEDICATIONS  (PRN):  acebutolol 200 milliGRAM(s) Oral at bedtime PRN Flutter  clonazePAM  Tablet 0.5 milliGRAM(s) Oral two times a day PRN anxiety  hydrOXYzine hydrochloride 25 milliGRAM(s) Oral daily PRN Nausea  QUEtiapine 50 milliGRAM(s) Oral at bedtime PRN insomnia, bipolar disorder  zolpidem 5 milliGRAM(s) Oral at bedtime PRN Insomnia    Home Medications:  acebutolol 200 mg oral capsule: 1 cap(s) orally once a day (at bedtime), As Needed (06 Jun 2022 18:41)  clonazePAM 0.5 mg oral tablet: 2 tab(s) orally once a day (at bedtime) (06 Jun 2022 18:41)  clonazePAM 0.5 mg oral tablet: 1 tab(s) orally 2 times a day, As Needed (06 Jun 2022 18:41)  hydrOXYzine hydrochloride 10 mg oral tablet: 2 tab(s) orally once a day, As Needed (06 Jun 2022 18:41)  lamoTRIgine 200 mg oral tablet: 2 tab(s) orally once a day (at bedtime) (06 Jun 2022 18:41)  Melatonin 10 mg oral capsule: 1 cap(s) orally once a day (at bedtime) (06 Jun 2022 18:41)  Multiple Vitamins oral tablet: 1 tab(s) orally once a day (06 Jun 2022 18:41)  QUEtiapine 50 mg oral tablet: 1 tab(s) orally once a day (at bedtime), As Needed (06 Jun 2022 18:41)  rosuvastatin 5 mg oral tablet: 1 tab(s) orally once a day (06 Jun 2022 18:41)  Trintellix 10 mg oral tablet: 1 tab(s) orally once a day ***take with 5mg for total = 15mg*** (06 Jun 2022 18:41)  Trintellix 5 mg oral tablet: 1 tab(s) orally once a day ***take with 10mg for total = 15mg*** (06 Jun 2022 18:41)  zolpidem 10 mg oral tablet: 1 tab(s) orally once a day (at bedtime), As Needed (06 Jun 2022 18:41)   Chief Complaint: headache     Interval Events:      All other review of systems is negative unless indicated above    Physical Exam:  T(C): 36.8 (07 Jun 2022 07:32), Max: 37.5 (07 Jun 2022 04:16)  T(F): 98.2 (07 Jun 2022 07:32), Max: 99.5 (07 Jun 2022 04:16)  HR: 65 (07 Jun 2022 07:32) (65 - 81)  BP: 118/67 (07 Jun 2022 07:32) (118/67 - 154/90)  BP(mean): 109 (06 Jun 2022 15:27) (109 - 109)  RR: 16 (07 Jun 2022 07:32) (12 - 19)  SpO2: 97% (07 Jun 2022 07:32) (94% - 100%)    Constitutional: NAD, awake and alert  HEENT: PERR, EOMI, Normal Hearing, MMM  Neck: Soft and supple, No LAD, No JVD  Respiratory: Breath sounds are clear bilaterally, No wheezing, rales or rhonchi  Cardiovascular: S1 and S2, regular rate and rhythm, no Murmurs, gallops or rubs  Gastrointestinal: Bowel Sounds present, soft, nontender, nondistended, no guarding, no rebound  Extremities: No peripheral edema  Vascular: 2+ peripheral pulses  Neurological: A/O x 3, no focal deficits  Musculoskeletal: 5/5 strength b/l upper and lower extremities  Skin: No rashes    Labs:                        11.9   7.01  )-----------( 326      ( 06 Jun 2022 15:16 )             37.2     06-07    141  |  113<H>  |  35<H>  ----------------------------<  100<H>  4.4   |  23  |  0.90    Ca    9.1      07 Jun 2022 07:19    TPro  7.4  /  Alb  3.5  /  TBili  0.3  /  DBili  x   /  AST  24  /  ALT  37  /  AlkPhos  88  06-06    CARDIAC MARKERS ( 06 Jun 2022 15:16 )  x     / x     / 171 U/L / x     / x      ESR 33    HCG 3        Trop (-)     Micro:    UA (-)  U Tox (-)  Salicylate/ Acetaminophen Serum (-)  BAL (-)  COVID PCR (-)     Radiology:    MR Head No Cont: 6/7/22: : No acute hemorrhage mass mass effect or acute territorial infarcts seen.    CT Head No Cont: 6/6/22: No acute intracranial hemorrhage, brain edema, or mass effect. No displaced calvarial fracture.    CT Angio Head w/ IV Cont : 6/6/22:  1.   Right carotid system:  No hemodynamically significant stenosis.  2.   Left carotid system:  No hemodynamically significant stenosis. 3.   Intracranial circulation: No hemodynamically significant stenosis. No significant vascular lesion.    Cardiac Testing:    TTE pending     Medications:  aspirin enteric coated 81 milliGRAM(s) Oral daily  atorvastatin 80 milliGRAM(s) Oral at bedtime  chlorhexidine 4% Liquid 1 Application(s) Topical <User Schedule>  clonazePAM  Tablet 1 milliGRAM(s) Oral at bedtime  enoxaparin Injectable 40 milliGRAM(s) SubCutaneous every 24 hours  lamoTRIgine 200 milliGRAM(s) Oral at bedtime  melatonin 10 milliGRAM(s) Oral at bedtime  multivitamin 1 Tablet(s) Oral daily  nicotine -   7 mG/24Hr(s) Patch 1 Patch Transdermal daily  vortioxetine 15 milliGRAM(s) Oral daily    MEDICATIONS  (PRN):  acebutolol 200 milliGRAM(s) Oral at bedtime PRN Flutter  clonazePAM  Tablet 0.5 milliGRAM(s) Oral two times a day PRN anxiety  hydrOXYzine hydrochloride 25 milliGRAM(s) Oral daily PRN Nausea  QUEtiapine 50 milliGRAM(s) Oral at bedtime PRN insomnia, bipolar disorder  zolpidem 5 milliGRAM(s) Oral at bedtime PRN Insomnia    Home Medications:  acebutolol 200 mg oral capsule: 1 cap(s) orally once a day (at bedtime), As Needed (06 Jun 2022 18:41)  clonazePAM 0.5 mg oral tablet: 2 tab(s) orally once a day (at bedtime) (06 Jun 2022 18:41)  clonazePAM 0.5 mg oral tablet: 1 tab(s) orally 2 times a day, As Needed (06 Jun 2022 18:41)  hydrOXYzine hydrochloride 10 mg oral tablet: 2 tab(s) orally once a day, As Needed (06 Jun 2022 18:41)  lamoTRIgine 200 mg oral tablet: 2 tab(s) orally once a day (at bedtime) (06 Jun 2022 18:41)  Melatonin 10 mg oral capsule: 1 cap(s) orally once a day (at bedtime) (06 Jun 2022 18:41)  Multiple Vitamins oral tablet: 1 tab(s) orally once a day (06 Jun 2022 18:41)  QUEtiapine 50 mg oral tablet: 1 tab(s) orally once a day (at bedtime), As Needed (06 Jun 2022 18:41)  rosuvastatin 5 mg oral tablet: 1 tab(s) orally once a day (06 Jun 2022 18:41)  Trintellix 10 mg oral tablet: 1 tab(s) orally once a day ***take with 5mg for total = 15mg*** (06 Jun 2022 18:41)  Trintellix 5 mg oral tablet: 1 tab(s) orally once a day ***take with 10mg for total = 15mg*** (06 Jun 2022 18:41)  zolpidem 10 mg oral tablet: 1 tab(s) orally once a day (at bedtime), As Needed (06 Jun 2022 18:41)   Chief Complaint: headache     Interval Events:  - Feeling well, symptoms resolved.  - anxious    All other review of systems is negative unless indicated above    Physical Exam:  T(C): 36.8 (07 Jun 2022 07:32), Max: 37.5 (07 Jun 2022 04:16)  T(F): 98.2 (07 Jun 2022 07:32), Max: 99.5 (07 Jun 2022 04:16)  HR: 65 (07 Jun 2022 07:32) (65 - 81)  BP: 118/67 (07 Jun 2022 07:32) (118/67 - 154/90)  BP(mean): 109 (06 Jun 2022 15:27) (109 - 109)  RR: 16 (07 Jun 2022 07:32) (12 - 19)  SpO2: 97% (07 Jun 2022 07:32) (94% - 100%)    Constitutional: NAD, awake and alert  HEENT: PERR, EOMI, Normal Hearing, MMM  Neck: Soft and supple, No LAD, No JVD  Respiratory: Breath sounds are clear bilaterally, No wheezing, rales or rhonchi  Cardiovascular: S1 and S2, regular rate and rhythm, no Murmurs, gallops or rubs  Gastrointestinal: Bowel Sounds present, soft, nontender, nondistended, no guarding, no rebound  Extremities: No peripheral edema  Vascular: 2+ peripheral pulses  Neurological: A/O x 3, no focal deficits  Musculoskeletal: 5/5 strength b/l upper and lower extremities  Skin: No rashes    Labs:                        11.9   7.01  )-----------( 326      ( 06 Jun 2022 15:16 )             37.2     06-07    141  |  113<H>  |  35<H>  ----------------------------<  100<H>  4.4   |  23  |  0.90    Ca    9.1      07 Jun 2022 07:19    TPro  7.4  /  Alb  3.5  /  TBili  0.3  /  DBili  x   /  AST  24  /  ALT  37  /  AlkPhos  88  06-06    CARDIAC MARKERS ( 06 Jun 2022 15:16 )  x     / x     / 171 U/L / x     / x      ESR 33    HCG 3        Trop (-)     Micro:    UA (-)  U Tox (-)  Salicylate/ Acetaminophen Serum (-)  BAL (-)  COVID PCR (-)     Radiology:    MR Head No Cont: 6/7/22: : No acute hemorrhage mass mass effect or acute territorial infarcts seen.    CT Head No Cont: 6/6/22: No acute intracranial hemorrhage, brain edema, or mass effect. No displaced calvarial fracture.    CT Angio Head w/ IV Cont : 6/6/22:  1.   Right carotid system:  No hemodynamically significant stenosis.  2.   Left carotid system:  No hemodynamically significant stenosis. 3.   Intracranial circulation: No hemodynamically significant stenosis. No significant vascular lesion.    Cardiac Testing:    TTE pending     Medications:  aspirin enteric coated 81 milliGRAM(s) Oral daily  atorvastatin 80 milliGRAM(s) Oral at bedtime  chlorhexidine 4% Liquid 1 Application(s) Topical <User Schedule>  clonazePAM  Tablet 1 milliGRAM(s) Oral at bedtime  enoxaparin Injectable 40 milliGRAM(s) SubCutaneous every 24 hours  lamoTRIgine 200 milliGRAM(s) Oral at bedtime  melatonin 10 milliGRAM(s) Oral at bedtime  multivitamin 1 Tablet(s) Oral daily  nicotine -   7 mG/24Hr(s) Patch 1 Patch Transdermal daily  vortioxetine 15 milliGRAM(s) Oral daily    MEDICATIONS  (PRN):  acebutolol 200 milliGRAM(s) Oral at bedtime PRN Flutter  clonazePAM  Tablet 0.5 milliGRAM(s) Oral two times a day PRN anxiety  hydrOXYzine hydrochloride 25 milliGRAM(s) Oral daily PRN Nausea  QUEtiapine 50 milliGRAM(s) Oral at bedtime PRN insomnia, bipolar disorder  zolpidem 5 milliGRAM(s) Oral at bedtime PRN Insomnia    Home Medications:  acebutolol 200 mg oral capsule: 1 cap(s) orally once a day (at bedtime), As Needed (06 Jun 2022 18:41)  clonazePAM 0.5 mg oral tablet: 2 tab(s) orally once a day (at bedtime) (06 Jun 2022 18:41)  clonazePAM 0.5 mg oral tablet: 1 tab(s) orally 2 times a day, As Needed (06 Jun 2022 18:41)  hydrOXYzine hydrochloride 10 mg oral tablet: 2 tab(s) orally once a day, As Needed (06 Jun 2022 18:41)  lamoTRIgine 200 mg oral tablet: 2 tab(s) orally once a day (at bedtime) (06 Jun 2022 18:41)  Melatonin 10 mg oral capsule: 1 cap(s) orally once a day (at bedtime) (06 Jun 2022 18:41)  Multiple Vitamins oral tablet: 1 tab(s) orally once a day (06 Jun 2022 18:41)  QUEtiapine 50 mg oral tablet: 1 tab(s) orally once a day (at bedtime), As Needed (06 Jun 2022 18:41)  rosuvastatin 5 mg oral tablet: 1 tab(s) orally once a day (06 Jun 2022 18:41)  Trintellix 10 mg oral tablet: 1 tab(s) orally once a day ***take with 5mg for total = 15mg*** (06 Jun 2022 18:41)  Trintellix 5 mg oral tablet: 1 tab(s) orally once a day ***take with 10mg for total = 15mg*** (06 Jun 2022 18:41)  zolpidem 10 mg oral tablet: 1 tab(s) orally once a day (at bedtime), As Needed (06 Jun 2022 18:41)

## 2022-06-07 NOTE — BH CONSULTATION LIAISON ASSESSMENT NOTE - NSICDXPASTMEDICALHX_GEN_ALL_CORE_FT
PAST MEDICAL HISTORY:  Bipolar disorder     Collagenous colitis     Depression     HLA B27 positive     Osteoporosis     Scoliosis

## 2022-06-07 NOTE — PROGRESS NOTE ADULT - ASSESSMENT
53 y/o female with a PMHx of Bipolar depression,  Sleep Disorder, Sinus Arrythmia with PVCs, Collagenous microscopic colitis and HLA B27,   Scoliosis, Osteoporosis who  presented to  [facility] c/o generalized weakness, and difficulty walking since 9am today.  Notably, pt  was seen at [facility] last night for ankle laceration while she was cleaning out her recently  father's garage.  Last night,  pt was here until 1230 AM and reported  she went to sleep around 1 AM.    She woke up at 9am and reported a  generalized weakness of her limbs with associated difficulty ambulating.    She reports she had to slowly crawl from bedpost to the bathroom and back in order to keep from falling.   She c/o dizziness and feeling as though she was "spinning".   Pt reports a similar episode occurred 2 months ago and self resolved after rest and sleep.    This time though she tried to go back to sleep.  But when she  woke  later, she still had difficulty standing/walking.  She reports inability to think clearly and in the ED,  pt was notably very tearful  and weeping when evaluated by ED staff.  Pt denied SI/HI, no hallucinations.      Pt c/o HA which is all over her head but reportedly more in the front part of her head.  In the ED her symptoms of weakness improved enough that she was able to sit up and  eat a late dinner.  She denies chest pain, palpitations.   No abd pain or resp complaints.  She had some loose stools yesterday which she reports as chronic and intermittent,   she also had an episode of vomiting today.   No urinary complaints.    She vapes several times a day and says it helps her with her chronic colitis.     Possible CVA/TIA  . NIHSS:0  Headache   Ataxia/gait disorder  and Generalized weakness  Possible medication toxicity  Hx of Bipolar Depression  Bereavement,   Hx of Collagenous microscopic colitis and HLA B27  Hx of Scoliosis,  pt has been sleeping in a lounge chair for 2 years  Vaping Hx,   - s/p CT brain  - s/p ASA 325mg in the ED  - Lamictal level pending  - admit to telemetry  - s/p 1 L NS in the ED  - continue Lamictal, seroquel and Trintilix for now  - Neurochecks  - MRI brain  - Neurology and Psychiatry consults   - Falls risk  - physical therapy  - smoking cessation,  low dose nicotine patch trial  - DVT proph: lovenox  - Adv Dir: Full Code   53 y/o female with a PMHx of Bipolar depression,  Sleep Disorder, Sinus Arrythmia with PVCs, Collagenous microscopic colitis and HLA B27,   Scoliosis, Osteoporosis who  presented to  [facility] c/o generalized weakness, and difficulty walking since 9am today.  Notably, pt  was seen at [facility] last night for ankle laceration while she was cleaning out her recently  father's garage.  Last night,  pt was here until 1230 AM and reported  she went to sleep around 1 AM.    She woke up at 9am and reported a  generalized weakness of her limbs with associated difficulty ambulating.    She reports she had to slowly crawl from bedpost to the bathroom and back in order to keep from falling.   She c/o dizziness and feeling as though she was "spinning".   Pt reports a similar episode occurred 2 months ago and self resolved after rest and sleep.    This time though she tried to go back to sleep.  But when she  woke  later, she still had difficulty standing/walking.  She reports inability to think clearly and in the ED,  pt was notably very tearful  and weeping when evaluated by ED staff.  Pt denied SI/HI, no hallucinations.    ~~Pt c/o HA which is all over her head but reportedly more in the front part of her head.  In the ED her symptoms of weakness improved enough that she was able to sit up and  eat a late dinner.  She denies chest pain, palpitations.   No abd pain or resp complaints.  She had some loose stools yesterday which she reports as chronic and intermittent,   she also had an episode of vomiting today.   No urinary complaints.    She vapes several times a day and says it helps her with her chronic colitis.     Dizziness. Headache. Ataxia/Gait disorder  Most likely acute positional vertigo. Possible related to medication toxicity.  - CTH / CTA Head and Neck without stroke, hemorrhage or significant stenosis. MRI without acute hemorrhage,  mass effect or acute territorial infarcts seen.  - Continue ASA for now with Atorvastatin  - Continue to monitor on tele, no events thus far  - s/p IVF, check orthostatics    - Continue Lamictal, Seroquel and Trintilix for now. F/u Lamictal level    Nicotine Use Disorder.  Smoking / Vaping cessation advised.  - Continue with nicotine patch    Hx of Bipolar Depression  - Continue Lamictal, Seroquel and Trintilix for now. F/u Lamictal level  - Psychiatry eval pending     DVT ppx  Continue Lovenox SQ daily    Dispo: Monitor on tele. c/w workup as above. 51 y/o female with a PMHx of Bipolar depression,  Sleep Disorder, Sinus Arrythmia with PVCs, Collagenous microscopic colitis and HLA B27,   Scoliosis, Osteoporosis who  presented to  [facility] c/o generalized weakness, and difficulty walking since 9am today.  Notably, pt  was seen at [facility] ED last night for ankle laceration while she was cleaning out her recently  father's garage.  Last night,  pt was here until 1230 AM and reported  she went to sleep around 1 AM.    She woke up at 9am and reported a  generalized weakness of her limbs with associated difficulty ambulating.    She reports she had to slowly crawl from bedpost to the bathroom and back in order to keep from falling.   She c/o dizziness and feeling as though she was "spinning".   Pt reports a similar episode occurred 2 months ago and self resolved after rest and sleep.    This time though she tried to go back to sleep.  But when she  woke  later, she still had difficulty standing/walking.  She reports inability to think clearly and in the ED,  pt was notably very tearful  and weeping when evaluated by ED staff.  Pt denied SI/HI, no hallucinations.    ~~Pt c/o HA which is all over her head but reportedly more in the front part of her head.  In the ED her symptoms of weakness improved enough that she was able to sit up and  eat a late dinner.  She denies chest pain, palpitations.   No abd pain or resp complaints.  She had some loose stools yesterday which she reports as chronic and intermittent,   she also had an episode of vomiting today.   No urinary complaints.    She vapes several times a day and says it helps her with her chronic colitis.     Dizziness. Headache. Ataxia/Gait disorder  Likely acute positional vertigo. Possible TIA.   CTH / CTA Head and Neck without stroke, hemorrhage or significant stenosis. MRI without acute hemorrhage, mass effect or acute territorial infarcts seen.  - Continue ASA and statin  - Continue to monitor on tele, no events thus far  - s/p IVF, check orthostatics    - Continue Lamictal, Seroquel and Trintilix for now. F/u Lamictal level    Prediabetes  A1c , c/w diet and lifestyle changes. repeat with your PCP outpatient.    HLD  Lipid panel reviewed. . C/w rosuvastatin, diet and lifestyle changes  Repeat with your PCP outpatient.    Nicotine Use Disorder.  Vaping cessation advised.  - Continue with nicotine patch    Hx of Bipolar Depression  - Continue Lamictal, Seroquel and Trintilix    - Psychiatry eval appreciated    DVT ppx  Continue Lovenox SQ daily    Dispo: Monitored on tele. no events. Cardio and echo f/u.

## 2022-06-07 NOTE — PHARMACOTHERAPY INTERVENTION NOTE - COMMENTS
Medication reconciliation completed.  Reviewed Medication list and confirmed med allergies with patient; confirmed with Dr. First Medellen.
recommended 40 mg daily based on renal function/weight
continuation of outpatient regimen

## 2022-06-07 NOTE — CONSULT NOTE ADULT - ASSESSMENT
No IV tpa given because…    -ASA/PLAVIX  -Atorvastatin  -DVT prophylaxis  -Dysphagia screen  -Speech and swallow eval  -PT eval/ rehab eval    Mangement d/w Pt / family /     Total Critical Care Time spent:     53 y/o F with a PMHx of Bipolar depression, Sleep Disorder, Sinus Arrythmia, Colitis, presented to [facility] ED on 6/6/22 with c/o generalized weakness, difficulty walking since 9 am today. Pt reportedly was seen in  ED the previous night for ankle laceration, was d/c around 1230 AM and reportedly went to sleep around 1 AM. She woke up at 9 am and felt weak, had difficulty ambulating, had to crawl from bed post to the bathroom and back, c/o dizziness and felt some "spinning", felt nauseous but did vomit x 1l,  tried to rest but when she woke up, symptoms persistsed, also had a global HA and difficulty focussing, came to ED, was evaluated for Code stroke, CTA H/N showed no LVO, stenosis or stroke. Was Given ASA. No IV tpa given because her sx were thought to be possible medication side affects -per ED  Pt reports a similar episode 2 months ago, it resolved after resting.    In the ED, pt was very tearful  and weeping, she has lost her father recently, is grieving. Her symptoms have improved, feels better this morning, has no HA, palpitations, was able to walk to bathroom.    # Most likely acute positional vertigo, improved; Pt reports a similar episode 2 months ago, resolved after resting.    # Ataxia/gait disorder  - improved, very subtle signs on exam    - continue ASA for now  - Atorvastatin  - MRI brain  - DVT prophylaxis  - PT eval    Mangement d/w Pt and NIDA Mendieta, N.P

## 2022-06-07 NOTE — BH CONSULTATION LIAISON ASSESSMENT NOTE - NSBHCHARTREVIEWLAB_PSY_A_CORE FT
11.9   7.01  )-----------( 326      ( 06 Jun 2022 15:16 )             37.2   06-07    141  |  113<H>  |  35<H>  ----------------------------<  100<H>  4.4   |  23  |  0.90    Ca    9.1      07 Jun 2022 07:19    TPro  7.4  /  Alb  3.5  /  TBili  0.3  /  DBili  x   /  AST  24  /  ALT  37  /  AlkPhos  88  06-06

## 2022-06-07 NOTE — BH CONSULTATION LIAISON ASSESSMENT NOTE - HPI (INCLUDE ILLNESS QUALITY, SEVERITY, DURATION, TIMING, CONTEXT, MODIFYING FACTORS, ASSOCIATED SIGNS AND SYMPTOMS)
Patient a 51 y/o  female, domiciled with family, hx of Bipolar D/O for years, no prior Psychiatric Hospitalization, no prior SI/SA, no drug abuse hx, medically has Scoliosis; Colitis and admitted due to Ataxia and slurry speech.    Patient in bed, AAOX3, endorses that her father  6 months back and has been tearful and meds does not seem to help her grief so she decoded to vape daily, and has been doing for past 6 months. She has good sleep/appetite, she had a similar episode 6 months earlier and was cleared after bed rest, but this time it seems somewhat prolonged so she decided to come to Hospital for clarification. She is under care of psychiatrist Dr. Anne Fields for years and takes Lamictal 400 mg/daily, with Trintellix 15 mg daily and Klonopin 1 mg HS with Melatonin to help with sleep. She has been omn this meds regime for years and has been doing very well except her father's death 6 moths back and since then has been vaping daily to help deal with her loss. She ah good sleep/appetite, no prior por current SI/SA, no drug absue hx., works in School with increase physical activity as well.

## 2022-06-07 NOTE — DISCHARGE NOTE NURSING/CASE MANAGEMENT/SOCIAL WORK - NSDCFUADDAPPT_GEN_ALL_CORE_FT
Follow-Up Appointment   Dr. Mckinney  Date: 07/20/22  Time: 2:45Pm  Phone: [phone]    Office has Ms. Henriquez on a wait list for an earlier appointment. They will contact the patient.

## 2022-06-07 NOTE — BH CONSULTATION LIAISON ASSESSMENT NOTE - CURRENT MEDICATION
MEDICATIONS  (STANDING):  aspirin enteric coated 81 milliGRAM(s) Oral daily  atorvastatin 80 milliGRAM(s) Oral at bedtime  chlorhexidine 4% Liquid 1 Application(s) Topical <User Schedule>  clonazePAM  Tablet 1 milliGRAM(s) Oral at bedtime  enoxaparin Injectable 40 milliGRAM(s) SubCutaneous every 24 hours  lamoTRIgine 200 milliGRAM(s) Oral at bedtime  melatonin 10 milliGRAM(s) Oral at bedtime  multivitamin 1 Tablet(s) Oral daily  nicotine -   7 mG/24Hr(s) Patch 1 Patch Transdermal daily  vortioxetine 15 milliGRAM(s) Oral daily    MEDICATIONS  (PRN):  acebutolol 200 milliGRAM(s) Oral at bedtime PRN Flutter  clonazePAM  Tablet 0.5 milliGRAM(s) Oral two times a day PRN anxiety  hydrOXYzine hydrochloride 25 milliGRAM(s) Oral daily PRN Nausea  QUEtiapine 50 milliGRAM(s) Oral at bedtime PRN insomnia, bipolar disorder  zolpidem 5 milliGRAM(s) Oral at bedtime PRN Insomnia

## 2022-06-07 NOTE — DISCHARGE NOTE NURSING/CASE MANAGEMENT/SOCIAL WORK - PATIENT PORTAL LINK FT
You can access the FollowMyHealth Patient Portal offered by [facility] by registering at the following website: [email or web address]. By joining [facility]’s FollowMyHealth portal, you will also be able to view your health information using other applications (apps) compatible with our system.

## 2022-06-07 NOTE — CONSULT NOTE ADULT - SUBJECTIVE AND OBJECTIVE BOX
CC: 52 y old Female who presents with a chief complaint of Possible CVA/TIA , Headache, Ataxia/gait disorder and Gen weakness    HPI:  51 y/o F with a PMHx of Bipolar depression, Sleep Disorder, Sinus Arrythmia with PVCs, Collagenous microscopic colitis and HLA B27,  Scoliosis, Osteoporosis who  presented to  [place] ED c/o generalized weakness, and difficulty walking since 9am today.  Notably, pt  was seen at [place] ED last night for ankle laceration while she was cleaning out her recently  father's garage.  Last night,  pt was here until 1230 AM and reported  she went to sleep around 1 AM.    She woke up at 9am and reported a  generalized weakness of her limbs with associated difficulty ambulating.    She reports she had to slowly crawl from bedpost to the bathroom and back in order to keep from falling.   She c/o dizziness and feeling as though she was "spinning".   Pt reports a similar episode occurred 2 months ago and self resolved after rest and sleep.    This time though she tried to go back to sleep.  But when she  woke  later, she still had difficulty standing/walking.  She reports inability to think clearly and in the ED,  pt was notably very tearful  and weeping when evaluated by ED staff.  Pt denied SI/HI, no hallucinations.      Pt c/o HA which is all over her head but reportedly more in the front part of her head.  In the ED her symptoms of weakness improved enough that she was able to sit up and  eat a late dinner.  She denies chest pain, palpitations.   No abd pain or resp complaints.  She had some loose stools yesterday which she reports as chronic and intermittent,   she also had an episode of vomiting today.   No urinary complaints.    She vapes several times a day and says it helps her with her chronic colitis.      PAST MEDICAL & SURGICAL HISTORY:  Bipolar disorder  Depression /Scoliosis  Osteoporosis  HLA B27 positive  Collagenous colitis  H/O osteoporosis      FAMILY HISTORY:  Family history of autoimmune disorder HLA-B27  Family history of myocardial infarction (Mother)      Social Hx:  Nonsmoker, no drug or alcohol use      MEDICATIONS  (STANDING):  aspirin enteric coated 81 milliGRAM(s) Oral daily  atorvastatin 80 milliGRAM(s) Oral at bedtime  clonazePAM  Tablet 1 milliGRAM(s) Oral at bedtime  enoxaparin Injectable 30 milliGRAM(s) SubCutaneous every 24 hours  lamoTRIgine 200 milliGRAM(s) Oral at bedtime  melatonin 10 milliGRAM(s) Oral at bedtime  multivitamin 1 Tablet(s) Oral daily  nicotine -   7 mG/24Hr(s) Patch 1 Patch Transdermal daily  vortioxetine 15 milliGRAM(s) Oral daily       Allergies  No Known Allergies      ROS: Pertinent positives in HPI, all other ROS were reviewed and are negative.        Vital Signs Last 24 Hrs  T(C): 36.8 (2022 07:32), Max: 37.5 (2022 04:16)  T(F): 98.2 (2022 07:32), Max: 99.5 (2022 04:16)  HR: 65 (2022 07:32) (65 - 81)  BP: 118/67 (2022 07:32) (118/67 - 154/90)  BP(mean): 109 (2022 15:27) (109 - 109)  RR: 16 (2022 07:32) (12 - 19)  SpO2: 97% (2022 07:32) (94% - 100%)        Constitutional: awake and alert.  HEENT: PERRLA, EOMI,   Neck: Supple.  Respiratory: Breath sounds are clear bilaterally  Cardiovascular: S1 and S2, regular / irregular rhythm  Gastrointestinal: soft, nontender  Extremities:  no edema  Vascular: Caritid Bruit - no  Musculoskeletal: no joint swelling/tenderness, no abnormal movements  Skin: No rashes      Neurological exam:  HF: A x O x 3. Appropriately interactive, normal affect. Speech fluent, No Aphasia or paraphasic errors. Naming /repetition intact   CN: ARABELLA, EOMI, VFF, facial sensation normal, no NLFD, tongue midline, Palate moves equally, SCM equal bilaterally  Motor: No pronator drift, Strength 5/5 in all 4 ext, normal bulk and tone, no tremor, rigidity or bradykinesia.    Sens: Intact to light touch / PP/ VS/ JS    Reflexes: Symmetric and normal . BJ 2+, BR 2+, KJ 2+, AJ 2+, downgoing toes b/l  Coord:  No FNFA, dysmetria, FLAQUITO intact   Gait/Balance: Normal/Cannot test    NIHSS:      Labs:       141  |  113<H>  |  35<H>  ----------------------------<  100<H>  4.4   |  23  |  0.90    Ca    9.1      2022 07:19    TPro  7.4  /  Alb  3.5  /  TBili  0.3  /  DBili  x   /  AST  24  /  ALT  37  /  AlkPhos  88                          11.9   7.01  )-----------( 326      ( 2022 15:16 )             37.2       Radiology:  - CT Head:  < from: CT Angio Neck w/ IV Cont (22 @ 17:08) >     1.   Right carotid system:  No hemodynamically significant stenosis.        2.   Left carotid system:  No hemodynamically significant stenosis.        3.   Intracranial circulation: No hemodynamically significant   stenosis. No significant vascular lesion.       CC: 52 y old F presents with a chief complaint of Possible CVA/TIA , Headache, Ataxia/gait disorder and Gen weakness    HPI:  53 y/o F with a PMHx of Bipolar depression, Sleep Disorder, Sinus Arrythmia, Colitis, Scoliosis, presented to [place] ED on 6/6/22 with c/o generalized weakness, difficulty walking since 9 am today. Pt reports she was seen in  ED the previous night for ankle laceration, was d/c around 1230 AM and reportedly went to sleep around 1 AM. She woke up at 9 am and felt weak, had difficulty ambulating, had to slowly crawl from bed post to the bathroom and back in order to keep from falling. She c/o dizziness and felt some "spinning", felt nauseous but did vomit x 1. She tried to rest but when she woke up, she had difficulty walking, had difficulty focussing, had a global / frontal HA, hence came to ED.  Pt was evaluated for Code stroke, CT angio H/N showed no LVO, stenosis or stroke.    In the ED, pt was very tearful  and weeping, she has lost her father recently, is grieving. Her symptoms improved in ED, was able to sit up and  eat a late dinner. She feels better this morning, has no HA, palpitations, was able to walk to bathroom.    Pt reports a similar episode 2 months ago, it resolved after resting. She denies recent change in meds, URI, ear pain  or fever, she Vapes several times a day, says it helps her with her chronic colitis.          PAST MEDICAL & SURGICAL HISTORY:  Bipolar disorder  Depression /Scoliosis  Osteoporosis  HLA B27 positive  Collagenous colitis  H/O osteoporosis      FAMILY HISTORY:  Family history of autoimmune disorder HLA-B27  Family history of myocardial infarction (Mother)      Social Hx:  Vapes +, Former smoker, no drug or alcohol use      MEDICATIONS  (STANDING):  aspirin enteric coated 81 milliGRAM(s) Oral daily  atorvastatin 80 milliGRAM(s) Oral at bedtime  clonazePAM  Tablet 1 milliGRAM(s) Oral at bedtime  enoxaparin Injectable 30 milliGRAM(s) SubCutaneous every 24 hours  lamoTRIgine 200 milliGRAM(s) Oral at bedtime  melatonin 10 milliGRAM(s) Oral at bedtime  multivitamin 1 Tablet(s) Oral daily  nicotine -   7 mG/24Hr(s) Patch 1 Patch Transdermal daily  vortioxetine 15 milliGRAM(s) Oral daily      Home Medications:   * Patient Currently Takes Medications as of 06-Jun-2022 18:35 documented in Structured Notes  · 	Trintellix 5 mg oral tablet: Last Dose Taken:  , 1 tab(s) orally once a day  	***take with 10mg for total = 15mg***  · 	Trintellix 10 mg oral tablet: , 1 tab(s) orally once a day  	***take with 5mg for total = 15mg***  · 	lamoTRIgine 200 mg oral tablet: Last Dose Taken:  , 2 tab(s) orally once a day (at bedtime)  · 	acebutolol 200 mg oral capsule: Last Dose Taken:  , 1 cap(s) orally once a day (at bedtime), As Needed  · 	clonazePAM 0.5 mg oral tablet: Last Dose Taken:  , 2 tab(s) orally once a day (at bedtime)  · 	zolpidem 10 mg oral tablet: Last Dose Taken:  , 1 tab(s) orally once a day (at bedtime), As Needed  · 	QUEtiapine 50 mg oral tablet: Last Dose Taken:  , 1 tab(s) orally once a day (at bedtime), As Needed  · 	rosuvastatin 5 mg oral tablet: Last Dose Taken:  , 1 tab(s) orally once a day  · 	hydrOXYzine hydrochloride 10 mg oral tablet: Last Dose Taken: 06-Jun-2022 AM, 2 tab(s) orally once a day, As Needed  · 	Melatonin 10 mg oral capsule: Last Dose Taken:  , 1 cap(s) orally once a day (at bedtime)  · 	Multiple Vitamins oral tablet: Last Dose Taken:  , 1 tab(s) orally once a day  · 	clonazePAM 0.5 mg ora     Allergies  No Known Allergies      ROS: Pertinent positives in HPI, all other ROS were reviewed and are negative.        Vital Signs Last 24 Hrs  T(C): 36.8 (07 Jun 2022 07:32), Max: 37.5 (07 Jun 2022 04:16)  T(F): 98.2 (07 Jun 2022 07:32), Max: 99.5 (07 Jun 2022 04:16)  HR: 65 (07 Jun 2022 07:32) (65 - 81)  BP: 118/67 (07 Jun 2022 07:32) (118/67 - 154/90)  BP(mean): 109 (06 Jun 2022 15:27) (109 - 109)  RR: 16 (07 Jun 2022 07:32) (12 - 19)  SpO2: 97% (07 Jun 2022 07:32) (94% - 100%)      Gen exam:  Normocephalic, in no distress, awake and alert.  HEENT: PERRLA, EOMI,   Neck: Supple.  Respiratory: Breath sounds are clear bilaterally  Cardiovascular: S1 and S2, regular   Extremities:  no edema  Vascular: Caritid Bruit - no  Musculoskeletal: no abnormal movements  Skin: No rashes      Neurological exam:  HF: A x O x 3. Appropriately interactive, normal affect. Speech fluent, No Aphasia or paraphasic errors. Naming /repetition intact   CN: ARABELLA, EOMI, VFF, facial sensation normal, Slight R NLFD, tongue midline, Palate moves equally, SCM equal bilaterally  Motor: Slight right pronator drift, Strength 5/5 in all 4 ext, normal bulk and tone, no tremor, rigidity.    Sens: Intact to light touch   Reflexes: Symmetric and normal . downgoing toes b/l  Coord:  No FNFA, dysmetria, FLAQUITO intact   Gait/Balance: Normal    NIHSS: 1      Labs:   06-07    141  |  113<H>  |  35<H>  ----------------------------<  100<H>  4.4   |  23  |  0.90    Ca    9.1      07 Jun 2022 07:19    TPro  7.4  /  Alb  3.5  /  TBili  0.3  /  DBili  x   /  AST  24  /  ALT  37  /  AlkPhos  88  06-06                        11.9   7.01  )-----------( 326      ( 06 Jun 2022 15:16 )             37.2       Radiology:  - CT Head:  < from: CT Angio Neck w/ IV Cont (06.06.22 @ 17:08) >     1.   Right carotid system:  No hemodynamically significant stenosis.        2.   Left carotid system:  No hemodynamically significant stenosis.        3.   Intracranial circulation: No hemodynamically significant   stenosis. No significant vascular lesion.

## 2022-06-07 NOTE — BH CONSULTATION LIAISON ASSESSMENT NOTE - NSBHCHARTREVIEWVS_PSY_A_CORE FT
Vital Signs Last 24 Hrs  T(C): 36.8 (07 Jun 2022 07:32), Max: 37.5 (07 Jun 2022 04:16)  T(F): 98.2 (07 Jun 2022 07:32), Max: 99.5 (07 Jun 2022 04:16)  HR: 65 (07 Jun 2022 07:32) (65 - 81)  BP: 118/67 (07 Jun 2022 07:32) (118/67 - 154/90)  BP(mean): 109 (06 Jun 2022 15:27) (109 - 109)  RR: 16 (07 Jun 2022 07:32) (12 - 19)  SpO2: 97% (07 Jun 2022 07:32) (94% - 100%)

## 2022-06-07 NOTE — BH CONSULTATION LIAISON ASSESSMENT NOTE - NSBHMSEBODY_PSY_A_CORE
Routing refill request to provider for review/approval because:  out of range:  phq9 >4         Average build

## 2022-06-07 NOTE — PHYSICAL THERAPY INITIAL EVALUATION ADULT - PLANNED THERAPY INTERVENTIONS, PT EVAL
NO Further Acute Care Physical Therapy Needs at this time as patient is already ambulating at baseline [place]. Will d/c patient off Physical Therapy at this time and sign off. Patient safe to ambulate ad mercedes. LOLIS Yanes made aware and in agreement with plan.

## 2022-06-07 NOTE — DISCHARGE NOTE NURSING/CASE MANAGEMENT/SOCIAL WORK - NSDCPEFALRISK_GEN_ALL_CORE
For information on Fall & Injury Prevention, visit: [email or web address] OR  [email or web address] OR  https://www.cdc.gov/steadi/patient.html

## 2022-06-08 ENCOUNTER — TRANSCRIPTION ENCOUNTER (OUTPATIENT)
Age: 52
End: 2022-06-08

## 2022-06-08 VITALS
HEART RATE: 68 BPM | SYSTOLIC BLOOD PRESSURE: 136 MMHG | TEMPERATURE: 98 F | DIASTOLIC BLOOD PRESSURE: 82 MMHG | OXYGEN SATURATION: 98 % | RESPIRATION RATE: 17 BRPM

## 2022-06-08 PROCEDURE — 99232 SBSQ HOSP IP/OBS MODERATE 35: CPT

## 2022-06-08 PROCEDURE — 99239 HOSP IP/OBS DSCHRG MGMT >30: CPT

## 2022-06-08 RX ORDER — MECLIZINE HCL 12.5 MG
12.5 TABLET ORAL THREE TIMES A DAY
Refills: 0 | Status: DISCONTINUED | OUTPATIENT
Start: 2022-06-08 | End: 2022-06-08

## 2022-06-08 RX ORDER — MECLIZINE HCL 12.5 MG
1 TABLET ORAL
Qty: 30 | Refills: 0
Start: 2022-06-08 | End: 2022-06-17

## 2022-06-08 RX ORDER — QUETIAPINE FUMARATE 200 MG/1
1 TABLET, FILM COATED ORAL
Qty: 0 | Refills: 0 | DISCHARGE

## 2022-06-08 RX ORDER — NICOTINE POLACRILEX 2 MG
1 GUM BUCCAL
Qty: 30 | Refills: 0
Start: 2022-06-08 | End: 2022-07-07

## 2022-06-08 RX ADMIN — VORTIOXETINE 15 MILLIGRAM(S): 5 TABLET, FILM COATED ORAL at 10:57

## 2022-06-08 RX ADMIN — Medication 1 TABLET(S): at 10:57

## 2022-06-08 RX ADMIN — Medication 81 MILLIGRAM(S): at 10:57

## 2022-06-08 NOTE — DISCHARGE NOTE PROVIDER - NSDCMRMEDTOKEN_GEN_ALL_CORE_FT
acebutolol 200 mg oral capsule: 1 cap(s) orally once a day (at bedtime), As Needed  clonazePAM 0.5 mg oral tablet: 2 tab(s) orally once a day (at bedtime)  clonazePAM 0.5 mg oral tablet: 1 tab(s) orally 2 times a day, As Needed  hydrOXYzine hydrochloride 10 mg oral tablet: 2 tab(s) orally once a day, As Needed  lamoTRIgine 200 mg oral tablet: 2 tab(s) orally once a day (at bedtime)  meclizine 12.5 mg oral tablet: 1 tab(s) orally 3 times a day, As needed, Dizziness  Melatonin 10 mg oral capsule: 1 cap(s) orally once a day (at bedtime)  Multiple Vitamins oral tablet: 1 tab(s) orally once a day  nicotine 7 mg/24 hr transdermal film, extended release: 1 patch transdermal once a day   rosuvastatin 5 mg oral tablet: 1 tab(s) orally once a day  Trintellix 10 mg oral tablet: 1 tab(s) orally once a day  ***take with 5mg for total = 15mg***  Trintellix 5 mg oral tablet: 1 tab(s) orally once a day  ***take with 10mg for total = 15mg***  zolpidem 10 mg oral tablet: 1 tab(s) orally once a day (at bedtime), As Needed

## 2022-06-08 NOTE — CONSULT NOTE ADULT - ASSESSMENT
Pt is a pleasant 53 y/o female with a PMHx of Bipolar depression,  Sleep Disorder, Sinus Arrythmia with PVCs, Collagenous microscopic colitis and HLA B27,   Scoliosis, Osteoporosis who  presented to  [place] ED c/o generalized weakness, and difficulty walking with vertigo symptoms.     6/8/22  all testing has come back negative. no evidence of a stroke.   suspect Labyrinthitis as a cause of the symptoms.   would recommend Meclizine 12.5 mg as needed for symptoms.   she is stable from a cardiac perspective and should be ready for discharge today.

## 2022-06-08 NOTE — PROGRESS NOTE ADULT - REASON FOR ADMISSION
Possible CVA/TIA    Headache   Ataxia/gait disorder  and Generalized weakness  Possible medication toxicity
Possible CVA/TIA. Headache. Ataxia/gait disorder  and Generalized weakness. Possible medication toxicity

## 2022-06-08 NOTE — CONSULT NOTE ADULT - SUBJECTIVE AND OBJECTIVE BOX
Pt is a pleasant 53 y/o female with a PMHx of Bipolar depression,  Sleep Disorder, Sinus Arrythmia with PVCs, Collagenous microscopic colitis and HLA B27,   Scoliosis, Osteoporosis who  presented to  [place] ED c/o generalized weakness, and difficulty walking since 9am today.  Notably, pt  was seen at [facility] ED last night for ankle laceration while she was cleaning out her recently  father's garage.  Last night,  pt was here until 1230 AM and reported  she went to sleep around 1 AM.    She woke up at 9am and reported a  generalized weakness of her limbs with associated difficulty ambulating.    She reports she had to slowly crawl from bedpost to the bathroom and back in order to keep from falling.   She c/o dizziness and feeling as though she was "spinning".   Pt reports a similar episode occurred 2 months ago and self resolved after rest and sleep.    This time though she tried to go back to sleep.  But when she  woke  later, she still had difficulty standing/walking.  She reports inability to think clearly and in the ED,  pt was notably very tearful  and weeping when evaluated by ED staff.  Pt denied SI/HI, no hallucinations.    Pt c/o HA which is all over her head but reportedly more in the front part of her head.  In the ED her symptoms of weakness improved enough that she was able to sit up and  eat a late dinner.  She denies chest pain, palpitations.   No abd pain or resp complaints.  She had some loose stools yesterday which she reports as chronic and intermittent,   she also had an episode of vomiting today.   No urinary complaints.    She vapes several times a day and says it helps her with her chronic colitis.      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   admitted with vertigo and slurred speech.   all studies are negative so far.   she is exhausted as she hasn't been sleeping and requesting a discharge home today.   less vertigo      PAST MEDICAL & SURGICAL HISTORY:  Bipolar disorder      Depression      Scoliosis      Osteoporosis      HLA B27 positive      Collagenous colitis      H/O osteoporosis        MEDICATIONS  (STANDING):  aspirin enteric coated 81 milliGRAM(s) Oral daily  atorvastatin 80 milliGRAM(s) Oral at bedtime  chlorhexidine 4% Liquid 1 Application(s) Topical <User Schedule>  clonazePAM  Tablet 1 milliGRAM(s) Oral at bedtime  enoxaparin Injectable 40 milliGRAM(s) SubCutaneous every 24 hours  lamoTRIgine 400 milliGRAM(s) Oral at bedtime  melatonin 10 milliGRAM(s) Oral at bedtime  multivitamin 1 Tablet(s) Oral daily  nicotine -   7 mG/24Hr(s) Patch 1 Patch Transdermal daily  sodium chloride 0.9%. 1000 milliLiter(s) (75 mL/Hr) IV Continuous <Continuous>  vortioxetine 15 milliGRAM(s) Oral daily    MEDICATIONS  (PRN):  acebutolol 200 milliGRAM(s) Oral at bedtime PRN Flutter  clonazePAM  Tablet 0.5 milliGRAM(s) Oral two times a day PRN anxiety  hydrOXYzine hydrochloride 25 milliGRAM(s) Oral daily PRN Nausea  QUEtiapine 50 milliGRAM(s) Oral at bedtime PRN insomnia, bipolar disorder  zolpidem 5 milliGRAM(s) Oral at bedtime PRN Insomnia    Allergies    No Known Allergies    Intolerances      FAMILY HISTORY:  Family history of autoimmune disorder  HLA-B27    Family history of myocardial infarction (Mother)          REVIEW OF SYSTEMS:    CONSTITUTIONAL: No weakness, fevers or chills  EYES/ENT: No visual changes;  No vertigo or throat pain   NECK: No pain or stiffness  RESPIRATORY: No cough, wheezing, hemoptysis; No shortness of breath  CARDIOVASCULAR: No chest pain or palpitations  GASTROINTESTINAL: No abdominal or epigastric pain. No nausea, vomiting, or hematemesis; No diarrhea or constipation. No melena or hematochezia.  GENITOURINARY: No dysuria, frequency or hematuria  NEUROLOGICAL: No numbness or weakness  SKIN: No itching, burning, rashes, or lesions   All other review of systems is negative unless indicated above      PHYSICAL EXAM:  Daily     Daily   Vital Signs Last 24 Hrs  T(C): 36.5 (2022 07:46), Max: 36.8 (2022 13:32)  T(F): 97.7 (2022 07:46), Max: 98.2 (2022 13:32)  HR: 68 (2022 07:46) (68 - 74)  BP: 136/82 (2022 07:46) (118/50 - 148/73)  BP(mean): --  RR: 17 (2022 07:46) (16 - 17)  SpO2: 98% (2022 07:46) (94% - 98%)    Constitutional: NAD, awake and alert, well-developed  HEENT: PERR, EOMI, Normal Hearing, MMM  Neck: Soft and supple, No LAD, No JVD  Respiratory: Breath sounds are clear bilaterally, No wheezing, rales or rhonchi  Cardiovascular: S1 and S2, regular rate and rhythm, no Murmurs, gallops or rubs  Gastrointestinal: Bowel Sounds present, soft, nontender, nondistended, no guarding, no rebound  Extremities: No peripheral edema  Vascular: 2+ peripheral pulses  Neurological: A/O x 3, no focal deficits  Musculoskeletal: 5/5 strength b/l upper and lower extremities  Skin: No rashes    LABS: All Labs Reviewed:                        11.9   7.01  )-----------( 326      ( 2022 15:16 )             37.2     06-07    141  |  113<H>  |  35<H>  ----------------------------<  100<H>  4.4   |  23  |  0.90    Ca    9.1      2022 07:19    TPro  7.4  /  Alb  3.5  /  TBili  0.3  /  DBili  x   /  AST  24  /  ALT  37  /  AlkPhos  88  06-06      CARDIAC MARKERS ( 2022 15:16 )  x     / x     / 171 U/L / x     / x          Blood Culture:     RADIOLOGY: < from: MR Head No Cont (22 @ 10:46) >  ACC: [id number] EXAM:  MR BRAIN                          PROCEDURE DATE:  2022          INTERPRETATION:  Clinical indication: Weakness and abnormal gait. Ataxia   and headache.    MRI of the brain was performed using sagittal T1 axial T1 T2 T2 FLAIR   diffusion and gradient echo sequence.    The lateral ventricles have a normal configuration    There is no acute hemorrhage mass or mass effect seen.    Evaluation of the diffusion weighted sequence demonstrates no abnormal   areas of restricted diffusion to suggest acute infarct.    The large vessels demonstrate normal flow voids    The visualized paranasal sinuses mastoid and middle ear clear.    IMPRESSION: No acute hemorrhage mass mass effect or acute territorial   infarcts seen.    < end of copied text >  < from: CT Angio Neck w/ IV Cont (22 @ 17:08) >  ACC: [id number] EXAM:  CT ANGIO NECK (W)AW IC                        ACC: [id number] EXAM:  CT ANGIO BRAIN (W)AW IC                          PROCEDURE DATE:  2022          INTERPRETATION:  Exam Date: 2022 5:08 PM    1. CT Angiography of the carotid arteries with  IV contrast  2. CT Angiography of the intracranial circulation with  IV contrast    CLINICAL INFORMATION: difficulty ambulating, vomiting, eval for vascular   occlusion/stenosis    TECHNIQUE:    CT angiography images were acquired from the aortic arch to   the vertex of the skull.   Images were acquired during rapid bolus   intravenous administration of 90 cc of Omnipaque 350. 3-D reformats were   obtained. Coronal and sagittal reformats were obtained.    FINDINGS:   No previous examinations are available for review.    The carotid circulation is intact without hemodynamically significant   stenosis.   The vertebral arteries are patent.    Intracranial vertebral arteries are intact without evidence of dissection   or hemodynamically significant stenosis. The basilar artery and posterior   cerebral arteries and are normal without hemodynamically significant   stenosis. Bilateral superior cerebellar arteries are intact. The   intracranial internal carotid arteries, middle cerebral arteries, and   anterior cerebral arteries are intact without hemodynamically significant   stenosis.  There is no evidence of aneurysm or vascular malformation.        IMPRESSION:        1.   Right carotid system:  No hemodynamically significant stenosis.        2.   Left carotid system:  No hemodynamically significant stenosis.        3.   Intracranial circulation: No hemodynamically significant   stenosis. No significant vascular lesion.    < end of copied text >  < from: Xray Chest 1 View- PORTABLE-Urgent (Xray Chest 1 View- PORTABLE-Urgent .) (22 @ 18:41) >  ACC: [id number] EXAM:  XR CHEST PORTABLE URGENT 1V                          PROCEDURE DATE:  2022          INTERPRETATION:  AP chest on 2022 at 6:17 PM. Patient had loss of   consciousness.    Heart magnified by technique. Moderate right thoracic curve again noted.    Lungs remain clear.    Chest is similar to Julita 10, 2016.    IMPRESSION: No acute finding or change.    < end of copied text >    EKG: NSR, No ischemic changes.     CARDIOLOGY TESTING:

## 2022-06-08 NOTE — DISCHARGE NOTE PROVIDER - HOSPITAL COURSE
see attached progress note:    Dizziness. Headache. Ataxia/Gait disorder likely BPPV. Possible Labyrinthitis  Unlikely CVA/TIA. CTH / CTA Head and Neck without stroke, hemorrhage or significant stenosis.   MRI without acute hemorrhage, mass effect or acute territorial infarcts seen.  - Continue statin, dc asa  - Monitored on tele, no events thus far  - s/p IVF, (-) orthostatics    - Continue meclizine PRN  - Echocardiogram ordered, refusing.   - ENT f/u outpatient.     Prediabetes  - A1c 5.9  - C/w diet and lifestyle changes  - Repeat with your PCP outpatient.    HLD  - Lipid panel reviewed. .   - C/w rosuvastatin, diet and lifestyle changes  - Repeat with your PCP outpatient.    Nicotine Use Disorder.  Vaping cessation advised.  - Continue with nicotine patch    Hx of Bipolar Depression  - Continue Lamictal, Seroquel and Trintilix    - Psychiatry eval appreciated    Dispo: discharge to *HOME* in stable condition    Final diagnosis, treatment plan, and follow-up recommendations were discussed and explained to the patient. The patient was given an opportunity to ask questions concerning the diagnosis and treatment plan. The patient acknowledged understanding of the diagnosis, treatment, and follow-up recommendations. The patient was advised to seek urgent care upon discharge if worsening symptoms develop prior to scheduled follow-up. Time spent on discharge included time with the patient, and also coordinating discharge care as outlined below.    Total time spent: 50 min

## 2022-06-08 NOTE — DISCHARGE NOTE PROVIDER - NSDCPNSUBOBJ_GEN_ALL_CORE
Chief Complaint: headache     Interval Events:  - Feeling well, symptoms resolved.  - anxious  - no overnight events, no events on tele    All other review of systems is negative unless indicated above    Physical Exam:  T(C): 36.5 (2022 07:46), Max: 36.8 (2022 13:32)  T(F): 97.7 (2022 07:46), Max: 98.2 (2022 13:32)  HR: 68 (2022 07:46) (68 - 74)  BP: 136/82 (2022 07:46) (118/50 - 148/73)  RR: 17 (2022 07:46) (16 - 17)  SpO2: 98% (2022 07:46) (94% - 98%)    Constitutional: NAD, awake and alert  HEENT: PERR, EOMI, Normal Hearing, MMM  Neck: Soft and supple, No LAD, No JVD  Respiratory: Breath sounds are clear bilaterally, No wheezing, rales or rhonchi  Cardiovascular: S1 and S2, regular rate and rhythm, no Murmurs, gallops or rubs  Gastrointestinal: Bowel Sounds present, soft, nontender, nondistended, no guarding, no rebound  Extremities: No peripheral edema  Vascular: 2+ peripheral pulses  Neurological: A/O x 3, no focal deficits  Musculoskeletal: 5/5 strength b/l upper and lower extremities  Skin: No rashes    Labs:               11.9   7.01  )-----------( 326      ( 2022 15:16 )             37.2     06-07    141  |  113<H>  |  35<H>  ----------------------------<  100<H>  4.4   |  23  |  0.90    Ca    9.1      2022 07:19    TPro  7.4  /  Alb  3.5  /  TBili  0.3  /  DBili  x   /  AST  24  /  ALT  37  /  AlkPhos  88  06-06    CARDIAC MARKERS ( 2022 15:16 )  x     / x     / 171 U/L / x     / x        ESR 33    HCG 3        Trop (-)     Micro:    UA (-)  U Tox (-)  Salicylate/ Acetaminophen Serum (-)  BAL (-)  COVID PCR (-)     Radiology:    MR Head No Cont: 22: : No acute hemorrhage mass mass effect or acute territorial infarcts seen.    CT Head No Cont: 22: No acute intracranial hemorrhage, brain edema, or mass effect. No displaced calvarial fracture.    CT Angio Head w/ IV Cont : 22:  1.   Right carotid system:  No hemodynamically significant stenosis.  2.   Left carotid system:  No hemodynamically significant stenosis. 3.   Intracranial circulation: No hemodynamically significant stenosis. No significant vascular lesion.    Cardiac Testing:    TTE pending -- patient refusing     Medications:  aspirin enteric coated 81 milliGRAM(s) Oral daily  atorvastatin 80 milliGRAM(s) Oral at bedtime  chlorhexidine 4% Liquid 1 Application(s) Topical <User Schedule>  clonazePAM  Tablet 1 milliGRAM(s) Oral at bedtime  enoxaparin Injectable 40 milliGRAM(s) SubCutaneous every 24 hours  lamoTRIgine 400 milliGRAM(s) Oral at bedtime  melatonin 10 milliGRAM(s) Oral at bedtime  multivitamin 1 Tablet(s) Oral daily  nicotine -   7 mG/24Hr(s) Patch 1 Patch Transdermal daily  sodium chloride 0.9%. 1000 milliLiter(s) (75 mL/Hr) IV Continuous <Continuous>  vortioxetine 15 milliGRAM(s) Oral daily    MEDICATIONS  (PRN):  acebutolol 200 milliGRAM(s) Oral at bedtime PRN Flutter  clonazePAM  Tablet 0.5 milliGRAM(s) Oral two times a day PRN anxiety  hydrOXYzine hydrochloride 25 milliGRAM(s) Oral daily PRN Nausea  meclizine 12.5 milliGRAM(s) Oral three times a day PRN Dizziness  QUEtiapine 50 milliGRAM(s) Oral at bedtime PRN insomnia, bipolar disorder  zolpidem 5 milliGRAM(s) Oral at bedtime PRN Insomnia      Home Medications:  acebutolol 200 mg oral capsule: 1 cap(s) orally once a day (at bedtime), As Needed (2022 18:41)  clonazePAM 0.5 mg oral tablet: 2 tab(s) orally once a day (at bedtime) (2022 18:41)  clonazePAM 0.5 mg oral tablet: 1 tab(s) orally 2 times a day, As Needed (2022 18:41)  hydrOXYzine hydrochloride 10 mg oral tablet: 2 tab(s) orally once a day, As Needed (2022 18:41)  lamoTRIgine 200 mg oral tablet: 2 tab(s) orally once a day (at bedtime) (2022 18:41)  Melatonin 10 mg oral capsule: 1 cap(s) orally once a day (at bedtime) (2022 18:41)  Multiple Vitamins oral tablet: 1 tab(s) orally once a day (2022 18:41)  QUEtiapine 50 mg oral tablet: 1 tab(s) orally once a day (at bedtime), As Needed (2022 18:41)  rosuvastatin 5 mg oral tablet: 1 tab(s) orally once a day (2022 18:41)  Trintellix 10 mg oral tablet: 1 tab(s) orally once a day ***take with 5mg for total = 15mg*** (2022 18:41)  Trintellix 5 mg oral tablet: 1 tab(s) orally once a day ***take with 10mg for total = 15mg*** (2022 18:41)  zolpidem 10 mg oral tablet: 1 tab(s) orally once a day (at bedtime), As Needed (2022 18:41)      Assessment and Plan:     53 y/o female with a PMHx of Bipolar depression,  Sleep Disorder, Sinus Arrythmia with PVCs, Collagenous microscopic colitis and HLA B27,   Scoliosis, Osteoporosis who  presented to  [facility] c/o generalized weakness, and difficulty walking since 9am today.  Notably, pt  was seen at [facility] ED last night for ankle laceration while she was cleaning out her recently  father's garage.  Last night,  pt was here until 1230 AM and reported  she went to sleep around 1 AM.    She woke up at 9am and reported a  generalized weakness of her limbs with associated difficulty ambulating.    She reports she had to slowly crawl from bedpost to the bathroom and back in order to keep from falling.   She c/o dizziness and feeling as though she was "spinning".   Pt reports a similar episode occurred 2 months ago and self resolved after rest and sleep.    This time though she tried to go back to sleep.  But when she  woke  later, she still had difficulty standing/walking.  She reports inability to think clearly and in the ED,  pt was notably very tearful  and weeping when evaluated by ED staff.  Pt denied SI/HI, no hallucinations.    ~~Pt c/o HA which is all over her head but reportedly more in the front part of her head.  In the ED her symptoms of weakness improved enough that she was able to sit up and  eat a late dinner.  She denies chest pain, palpitations.   No abd pain or resp complaints.  She had some loose stools yesterday which she reports as chronic and intermittent,   she also had an episode of vomiting today.   No urinary complaints.    She vapes several times a day and says it helps her with her chronic colitis.     Dizziness. Headache. Ataxia/Gait disorder likely BPPV. Possible Labyrinthitis  Unlikely CVA/TIA. CTH / CTA Head and Neck without stroke, hemorrhage or significant stenosis.   MRI without acute hemorrhage, mass effect or acute territorial infarcts seen.  - Continue statin, dc asa  - Monitored on tele, no events thus far  - s/p IVF, (-) orthostatics    - Continue meclizine PRN  - Echocardiogram ordered, refusing.   - ENT f/u outpatient.     Prediabetes  - A1c   - C/w diet and lifestyle changes  - Repeat with your PCP outpatient.    HLD  - Lipid panel reviewed. .   - C/w rosuvastatin, diet and lifestyle changes  - Repeat with your PCP outpatient.    Nicotine Use Disorder.  Vaping cessation advised.  - Continue with nicotine patch    Hx of Bipolar Depression  - Continue Lamictal, Seroquel and Trintilix    - Psychiatry eval appreciated    DVT ppx  Continue Lovenox SQ daily    Dispo: home.

## 2022-06-08 NOTE — DISCHARGE NOTE PROVIDER - PROVIDER TOKENS
PROVIDER:[TOKEN:[[id number]],FOLLOWUP:[1 week]],PROVIDER:[TOKEN:[[id number]],FOLLOWUP:[1 week]]

## 2022-06-08 NOTE — PROGRESS NOTE ADULT - ASSESSMENT
51 y/o F with a PMHx of Bipolar depression, Sleep Disorder, Sinus Arrythmia, Colitis, presented to [facility] ED on 6/6/22 with c/o generalized weakness, difficulty walking since 9 am today. Pt reportedly was seen in  ED the previous night for ankle laceration, was d/c around 1230 AM and reportedly went to sleep around 1 AM. She woke up at 9 am and felt weak, had difficulty ambulating, had to crawl from bed post to the bathroom and back, c/o dizziness and felt some "spinning", felt nauseous but did vomit x 1l,  tried to rest but when she woke up, symptoms persistsed, also had a global HA and difficulty focussing, came to ED, was evaluated for Code stroke, CTA H/N - no LVO, stenosis or stroke. Was Given ASA. No IV tpa given because sx were thought to be possible medication side affects -per ED  Pt reports a similar episode 2 months ago, it resolved after resting.      # Most likely acute positional vertigo, Symptoms have resolved fully,  Pt reports a similar episode 2 months ago, resolved after resting. MRI brain - no acute infarct      # Ataxia/gait disorder  - improved    - PT eval, if symptoms recur, VT as OP  - Pt educated regarding Vertigo and postural chnages    Mangement d/w Pt and NIDA Mendieta N.P

## 2022-06-08 NOTE — DISCHARGE NOTE PROVIDER - CARE PROVIDER_API CALL
Adriana Mckinney J  CARDIOVASCULAR DISEASE  [address] [place] [address]  Phone: [phone]  Fax: [phone]  Follow Up Time: 1 week    Carl Byrne  OTOLARYNGOLOGY  [address] [place] [address], [address]  [place] [address]  Phone: [phone]  Fax: [phone]  Follow Up Time: 1 week

## 2022-06-08 NOTE — PROGRESS NOTE ADULT - SUBJECTIVE AND OBJECTIVE BOX
Pt has noted resolution of dizziness and gait ataxia, able to walk, has no compliants, cheerful    MRI brain - no acute infarct    ROS: As above, other ROS nregative    MEDICATIONS  (STANDING):  aspirin enteric coated 81 milliGRAM(s) Oral daily  atorvastatin 80 milliGRAM(s) Oral at bedtime  chlorhexidine 4% Liquid 1 Application(s) Topical <User Schedule>  clonazePAM  Tablet 1 milliGRAM(s) Oral at bedtime  enoxaparin Injectable 40 milliGRAM(s) SubCutaneous every 24 hours  lamoTRIgine 400 milliGRAM(s) Oral at bedtime  melatonin 10 milliGRAM(s) Oral at bedtime  multivitamin 1 Tablet(s) Oral daily  nicotine -   7 mG/24Hr(s) Patch 1 Patch Transdermal daily  sodium chloride 0.9%. 1000 milliLiter(s) (75 mL/Hr) IV Continuous <Continuous>  vortioxetine 15 milliGRAM(s) Oral daily      Vital Signs Last 24 Hrs  T(C): 36.5 (08 Jun 2022 07:46), Max: 36.8 (07 Jun 2022 13:32)  T(F): 97.7 (08 Jun 2022 07:46), Max: 98.2 (07 Jun 2022 13:32)  HR: 68 (08 Jun 2022 07:46) (68 - 74)  BP: 136/82 (08 Jun 2022 07:46) (118/50 - 148/73)  BP(mean): --  RR: 17 (08 Jun 2022 07:46) (16 - 17)  SpO2: 98% (08 Jun 2022 07:46) (94% - 98%)    Gen exam:  Normocephalic, in no distress, awake and alert.  HEENT: PERRLA, EOMI,   Neck: Supple.        Neurological exam:  HF: A x O x 3. Appropriately interactive, normal affect. Speech fluent, No Aphasia or paraphasic errors. Naming /repetition intact   CN: ARABELLA, EOMI, VFF, facial sensation normal, No NLFD, tongue midline, Palate moves equally, SCM equal bilaterally  Motor: Slight right pronator drift, Strength 5/5 in all 4 ext, normal bulk and tone, no tremor, rigidity.    Sens: Intact to light touch   Reflexes: Symmetric and normal . downgoing toes b/l  Coord:  No FNFA, dysmetria, FLAQUITO intact   Gait/Balance: Normal    NIHSS: 0                      11.9   7.01  )-----------( 326      ( 06 Jun 2022 15:16 )             37.2     06-07    141  |  113<H>  |  35<H>  ----------------------------<  100<H>  4.4   |  23  |  0.90    Ca    9.1      07 Jun 2022 07:19    TPro  7.4  /  Alb  3.5  /  TBili  0.3  /  DBili  x   /  AST  24  /  ALT  37  /  AlkPhos  88  06-06 06-07 Chol 185 LDL -- HDL 52 Trig 140    Radiology report:  < from: MR Head No Cont (06.07.22 @ 10:46) >  IMPRESSION: No acute hemorrhage mass mass effect or acute territorial   infarcts seen.      -CT Head:  < from: CT Angio Neck w/ IV Cont (06.06.22 @ 17:08) >     1.   Right carotid system:  No hemodynamically significant stenosis.        2.   Left carotid system:  No hemodynamically significant stenosis.        3.   Intracranial circulation: No hemodynamically significant   stenosis. No significant vascular lesion.

## 2022-06-08 NOTE — DISCHARGE NOTE PROVIDER - NSDCCPCAREPLAN_GEN_ALL_CORE_FT
PRINCIPAL DISCHARGE DIAGNOSIS  Diagnosis: Ataxia  Assessment and Plan of Treatment: WHAT IS BENIGN PAROXYSMAL POSITIONAL VERTIGO? BPPV is an inner ear condition that causes you to suddenly feel dizzy. BPPV happens when small pieces of calcium break loose and lump together in one of your inner ear canals.  *Concern for possible Labyrinthitis - reccomend ENT evaluation outpatient - referral on discharge* Continue Meclizine as needed for dizziness/vertigo.  THINGS TO DO: (1) Try to avoid sudden head movements (2) Raise and support your head when you lie down (3) Change positions often when you are lying down (4) Stay well hydrated  MONITOR THESE SIGNS AND SYMPTOMS: (1) Worsening blanace or falling often (2) Severe headache (3) New changes in your vision (4) Feeling weak or confused (5) problems with hearing or ringing in your ear. If you experience any of these, DO alert your primary care provider, or return to the Emergency Department if you feel very sick.      SECONDARY DISCHARGE DIAGNOSES  Diagnosis: Prediabetes  Assessment and Plan of Treatment: A1c 5.9, continue with diet and excersice and repeat your a1c with your primary care provider.    Diagnosis: Nicotine dependence  Assessment and Plan of Treatment: Smoking/vaping cessation advised. Nictoinepatch sent to pharmacy to assist with smoking cessation.

## 2022-06-08 NOTE — CONSULT NOTE ADULT - REASON FOR ADMISSION
Possible CVA/TIA    Headache   Ataxia/gait disorder  and Generalized weakness  Possible medication toxicity
Possible CVA/TIA    Headache   Ataxia/gait disorder  and Generalized weakness  Possible medication toxicity

## 2022-06-08 NOTE — DISCHARGE NOTE PROVIDER - CARE PROVIDERS DIRECT ADDRESSES
,[email or web address]

## 2022-06-14 DIAGNOSIS — R51.9 HEADACHE, UNSPECIFIED: ICD-10-CM

## 2022-06-14 DIAGNOSIS — H81.09 MENIERE'S DISEASE, UNSPECIFIED EAR: ICD-10-CM

## 2022-06-14 DIAGNOSIS — M41.9 SCOLIOSIS, UNSPECIFIED: ICD-10-CM

## 2022-06-14 DIAGNOSIS — K52.831 COLLAGENOUS COLITIS: ICD-10-CM

## 2022-06-14 DIAGNOSIS — E78.5 HYPERLIPIDEMIA, UNSPECIFIED: ICD-10-CM

## 2022-06-14 DIAGNOSIS — M81.0 AGE-RELATED OSTEOPOROSIS WITHOUT CURRENT PATHOLOGICAL FRACTURE: ICD-10-CM

## 2022-06-14 DIAGNOSIS — F17.298 NICOTINE DEPENDENCE, OTHER TOBACCO PRODUCT, WITH OTHER NICOTINE-INDUCED DISORDERS: ICD-10-CM

## 2022-06-14 DIAGNOSIS — R73.03 PREDIABETES: ICD-10-CM

## 2022-06-15 ENCOUNTER — NON-APPOINTMENT (OUTPATIENT)
Age: 52
End: 2022-06-15

## 2022-06-15 PROBLEM — F32.A DEPRESSION, UNSPECIFIED: Chronic | Status: ACTIVE | Noted: 2022-06-06

## 2022-06-15 PROBLEM — F31.9 BIPOLAR DISORDER, UNSPECIFIED: Chronic | Status: ACTIVE | Noted: 2022-06-06

## 2022-06-15 PROBLEM — M41.9 SCOLIOSIS, UNSPECIFIED: Chronic | Status: ACTIVE | Noted: 2022-06-06

## 2022-06-15 PROBLEM — M81.0 AGE-RELATED OSTEOPOROSIS WITHOUT CURRENT PATHOLOGICAL FRACTURE: Chronic | Status: ACTIVE | Noted: 2022-06-07

## 2022-06-15 PROBLEM — Z15.89 GENETIC SUSCEPTIBILITY TO OTHER DISEASE: Chronic | Status: ACTIVE | Noted: 2022-06-07

## 2022-06-15 PROBLEM — K52.831 COLLAGENOUS COLITIS: Chronic | Status: ACTIVE | Noted: 2022-06-07

## 2022-06-30 ENCOUNTER — APPOINTMENT (OUTPATIENT)
Dept: OTOLARYNGOLOGY | Facility: CLINIC | Age: 52
End: 2022-06-30

## 2022-06-30 VITALS — BODY MASS INDEX: 23.7 KG/M2 | WEIGHT: 160 LBS | TEMPERATURE: 98.1 F | HEIGHT: 69 IN

## 2022-06-30 VITALS — TEMPERATURE: 98.1 F | HEIGHT: 69 IN | BODY MASS INDEX: 23.7 KG/M2 | WEIGHT: 160 LBS

## 2022-06-30 DIAGNOSIS — R27.0 ATAXIA, UNSPECIFIED: ICD-10-CM

## 2022-06-30 DIAGNOSIS — R42 DIZZINESS AND GIDDINESS: ICD-10-CM

## 2022-06-30 PROCEDURE — 92557 COMPREHENSIVE HEARING TEST: CPT

## 2022-06-30 PROCEDURE — 92567 TYMPANOMETRY: CPT

## 2022-06-30 PROCEDURE — 99203 OFFICE O/P NEW LOW 30 MIN: CPT | Mod: 25

## 2022-06-30 RX ORDER — LAMOTRIGINE 200 MG/1
200 TABLET ORAL
Qty: 180 | Refills: 0 | Status: ACTIVE | COMMUNITY
Start: 2022-01-25

## 2022-06-30 RX ORDER — ZOLPIDEM TARTRATE 10 MG/1
10 TABLET ORAL
Qty: 30 | Refills: 0 | Status: ACTIVE | COMMUNITY
Start: 2022-05-10

## 2022-06-30 RX ORDER — CLONAZEPAM 0.5 MG/1
0.5 TABLET ORAL
Qty: 120 | Refills: 0 | Status: ACTIVE | COMMUNITY
Start: 2022-01-08

## 2022-06-30 RX ORDER — VORTIOXETINE 10 MG/1
10 TABLET, FILM COATED ORAL
Qty: 90 | Refills: 0 | Status: ACTIVE | COMMUNITY
Start: 2021-12-29

## 2022-06-30 RX ORDER — VORTIOXETINE 5 MG/1
5 TABLET, FILM COATED ORAL
Qty: 30 | Refills: 0 | Status: ACTIVE | COMMUNITY
Start: 2022-02-22

## 2022-06-30 RX ORDER — IBUPROFEN 600 MG/1
600 TABLET, FILM COATED ORAL
Qty: 30 | Refills: 0 | Status: ACTIVE | COMMUNITY
Start: 2022-01-17

## 2022-06-30 RX ORDER — ROSUVASTATIN CALCIUM 5 MG/1
5 TABLET, FILM COATED ORAL
Qty: 90 | Refills: 0 | Status: ACTIVE | COMMUNITY
Start: 2022-01-20

## 2022-06-30 RX ORDER — QUETIAPINE FUMARATE 50 MG/1
50 TABLET ORAL
Qty: 30 | Refills: 0 | Status: ACTIVE | COMMUNITY
Start: 2022-01-25

## 2022-06-30 NOTE — REASON FOR VISIT
[Initial Consultation] : an initial consultation for [Vertigo] : vertigo [FreeTextEntry2] : ataxia/  gait disorder

## 2022-06-30 NOTE — HISTORY OF PRESENT ILLNESS
[de-identified] : Months ago awoke and staggering and uncoordinated no vertigo lasted several hours\par 6/6/22 awoke w imbalance persisted  full day\par slurred speech and confusion\par to [facility]\par had cardiac eval and neuro consult-reported neg

## 2022-06-30 NOTE — ASSESSMENT
[FreeTextEntry1] : exam unremarkable\par reviewed MRI 6/7/22\par audio wnl\par symptoms entirely resolved at present\par recurring ataxia/imbalance central verses peripheral  etiology\par rec vng\par f w neurology

## 2022-06-30 NOTE — DATA REVIEWED
[de-identified] : Hearing is within normal limits bilaterally\par Type A  tymps-normal middle ear function.

## 2022-07-25 ENCOUNTER — APPOINTMENT (OUTPATIENT)
Dept: OTOLARYNGOLOGY | Facility: CLINIC | Age: 52
End: 2022-07-25

## 2022-07-25 NOTE — ED STATDOCS - MDM PATIENT STATEMENT FOR ADDL TREATMENT
Patient with one or more new problems requiring additional work-up/treatment. 3 = A little assistance

## 2023-07-24 NOTE — ED ADULT NURSE NOTE - CHIEF COMPLAINT QUOTE
Patient presents with  via EMS. Patient states they were here this morning until 2 am getting suture repair. Patient woke up this morning with generalized weakness. Can move all extremities. Patient tearful at triage. room air

## 2024-01-16 NOTE — PHYSICAL THERAPY INITIAL EVALUATION ADULT - GAIT DEVIATIONS NOTED, PT EVAL
Mild Dizziness and Mild Waddling Heel Predominant Gait Pattern Noted however no MAJOR Gait Deviations or Balance Impairments Found
no

## 2024-04-23 ENCOUNTER — APPOINTMENT (OUTPATIENT)
Dept: VASCULAR SURGERY | Facility: CLINIC | Age: 54
End: 2024-04-23
Payer: COMMERCIAL

## 2024-04-23 VITALS
OXYGEN SATURATION: 98 % | DIASTOLIC BLOOD PRESSURE: 82 MMHG | HEART RATE: 72 BPM | BODY MASS INDEX: 22.96 KG/M2 | WEIGHT: 155 LBS | SYSTOLIC BLOOD PRESSURE: 123 MMHG | HEIGHT: 69 IN

## 2024-04-23 DIAGNOSIS — I87.2 VENOUS INSUFFICIENCY (CHRONIC) (PERIPHERAL): ICD-10-CM

## 2024-04-23 PROCEDURE — 99203 OFFICE O/P NEW LOW 30 MIN: CPT

## 2024-04-23 PROCEDURE — 93970 EXTREMITY STUDY: CPT

## 2024-04-23 PROCEDURE — 99204 OFFICE O/P NEW MOD 45 MIN: CPT

## 2024-04-23 RX ORDER — ACEBUTOLOL HYDROCHLORIDE 200 MG/1
200 CAPSULE ORAL
Qty: 90 | Refills: 0 | Status: COMPLETED | COMMUNITY
Start: 2022-02-14 | End: 2024-04-23

## 2024-04-23 RX ORDER — HYDROXYZINE HYDROCHLORIDE 10 MG/1
10 TABLET ORAL
Qty: 90 | Refills: 0 | Status: COMPLETED | COMMUNITY
Start: 2022-02-01 | End: 2024-04-23

## 2024-04-23 RX ORDER — BUDESONIDE 3 MG/1
3 CAPSULE, COATED PELLETS ORAL
Qty: 90 | Refills: 0 | Status: COMPLETED | COMMUNITY
Start: 2022-06-15 | End: 2024-04-23

## 2024-04-23 RX ORDER — PHENYLEPHRINE HCL 10 MG
7 TABLET ORAL
Qty: 30 | Refills: 0 | Status: COMPLETED | COMMUNITY
Start: 2022-06-08 | End: 2024-04-23

## 2024-04-23 RX ORDER — MECLIZINE HYDROCHLORIDE 12.5 MG/1
12.5 TABLET ORAL
Qty: 30 | Refills: 0 | Status: COMPLETED | COMMUNITY
Start: 2022-06-08 | End: 2024-04-23

## 2024-04-23 RX ORDER — SULFAMETHOXAZOLE AND TRIMETHOPRIM 800; 160 MG/1; MG/1
800-160 TABLET ORAL
Qty: 14 | Refills: 0 | Status: COMPLETED | COMMUNITY
Start: 2022-06-14 | End: 2024-04-23

## 2024-04-23 RX ORDER — DEXTROAMPHETAMINE SACCHARATE, AMPHETAMINE ASPARTATE, DEXTROAMPHETAMINE SULFATE, AND AMPHETAMINE SULFATE 3.75; 3.75; 3.75; 3.75 MG/1; MG/1; MG/1; MG/1
TABLET ORAL
Refills: 0 | Status: COMPLETED | COMMUNITY
End: 2024-04-23

## 2024-04-24 PROBLEM — I87.2 CHRONIC VENOUS INSUFFICIENCY: Status: ACTIVE | Noted: 2024-04-24

## 2024-04-24 NOTE — PHYSICAL EXAM
[TextEntry] : CONSTITUTIONAL: alert and oriented in NAD NEURO:  AAOx3; Sensory/Motor Intact RESPIRATORY:  respirations non labored CV:   regular rate SKIN:     -edema     - Rash   - Pigmentation     -  Ulcer If yes: Location: (_) Right   (_) Left      Description: _ MSKL:  (+) Ankle ROM intact VASCULAR:  Superficial phlebitis?  [] Yes   (+) No PULSE EXAM: + distal pulses bulging VV anterior thigh and calf right scattered bulging VV

## 2024-04-24 NOTE — HISTORY OF PRESENT ILLNESS
[FreeTextEntry1] : History of present illness: 52yo F with hx of VV and previous SAP UGS presents for consultation with c/o bilateral recurrent VV.   She c/o heaviness and aching in leg with dependency.       What factors precipitate symptoms?   x Prolonged standing       Prolonged sitting        Walking     Exercise         Symptoms have altered activities of daily living by:  x Unable to stand for prolonged period of time    _ Difficulty completing work tasks. Does the patient's daily activity require prolonged standing?  x Yes    No How many times over a 2 week period do they take OTC meds?  x 0-1     2-4      4-6 Conservative therapy: Has the patient previously implemented any conservative therapy or OTC medication for alleviation of symptoms related to varicose veins? x  Leg elevation   x  Walking and other forms of exercise    _ Weight reduction  _  NSAIDs or Pain meds                    Graduated, elasticized compression stockings:  x Yes _ No  If yes, what kind?  x  Knee-high _  Thigh high                 x 20-30mmHg    _  30-40mmHg

## 2024-06-18 ENCOUNTER — APPOINTMENT (OUTPATIENT)
Dept: VASCULAR SURGERY | Facility: CLINIC | Age: 54
End: 2024-06-18
Payer: COMMERCIAL

## 2024-06-18 VITALS
RESPIRATION RATE: 18 BRPM | SYSTOLIC BLOOD PRESSURE: 122 MMHG | DIASTOLIC BLOOD PRESSURE: 77 MMHG | OXYGEN SATURATION: 98 % | HEART RATE: 71 BPM

## 2024-06-18 PROCEDURE — 36475 ENDOVENOUS RF 1ST VEIN: CPT | Mod: LT

## 2024-06-18 NOTE — PROCEDURE
[FreeTextEntry1] : left ASV RFA  [FreeTextEntry2] : Chronic Venous Insufficiency [FreeTextEntry3] : Preoperative Diagnosis: Varicose veins, symptomatic, chronic venous insufficiency (I87.2).          Postoperative Diagnosis: Same      Attending: Wu Whitmore MD       Assistant: NATALIE Bravo       				       Procedure:      1.	Endovenous Radiofrequency Ablation of the Left ASV (30729)             Anesthesia: +Local/Tumescent (50cc 1% Lidocaine in 500cc NS)   Complications: none        Length of segment treated:_ 11cm            Description: Chart was reviewed, including ultrasound report and operative plan. Consent was obtained from the patient, risks and benefits of the procedure were explained. Prior to the start of the procedure, the patient was examined in the standing position. The skin was marked to identify superficial varicosities.  The patient was then placed on the procedure table and the entire lower extremity was prepped and a sterile field using barriers was created.  Time out was performed.            1. Using ultrasound guidance a 19-guage needle was placed directly into the saphenous vein. The guide wire was then placed and the needle removed.  A 7F sheath for the RFA catheter was then introduced along the guide wire.  The RFA catheter was then introduced through the sheath.  The catheter tip position was then confirmed at approximately 2 cm from the junction.  The perivenous tissue of the saphenous vein was then anesthetized using Tumescent anesthesia, 150 cc of 0.1% lidocaine solution. The catheter placement was again confirmed using ultrasound 2cm from the saphenous junction. Segmental ablation of the saphenous was performed with radiofrequency energy using 120 degrees Celsius for 20 sec each segment (each segment was treated twice). The catheter was removed and local pressure applied.  Ultrasound was again carried out, which demonstrated significant venospasm of the saphenous vein and no evidence of trauma to the deep veins.                 The incisions and injection sites were covered with 4x4 gauze and abdominal pads and the extremity was wrapped with kerlix and Coban using standard technique.  This was followed by application of a 20-30 mm Hg graduated venous compression hose.                The patient was given oral and written instructions for aftercare, which included frequent ambulation and leg elevation.  The patient was instructed to take NSAIDs.  The patient was scheduled for a follow-up visit in approximately 5-7 days with a venous duplex ultrasound of the treated leg to evaluate for success of procedure and evaluate for any deep vein thrombosis. The patient tolerated the procedure well and left the office in excellent condition ambulating without difficulty.

## 2024-06-24 ENCOUNTER — APPOINTMENT (OUTPATIENT)
Dept: VASCULAR SURGERY | Facility: CLINIC | Age: 54
End: 2024-06-24

## 2024-06-24 PROCEDURE — 93971 EXTREMITY STUDY: CPT | Mod: LT

## 2024-07-02 ENCOUNTER — APPOINTMENT (OUTPATIENT)
Dept: VASCULAR SURGERY | Facility: CLINIC | Age: 54
End: 2024-07-02
Payer: COMMERCIAL

## 2024-07-02 VITALS
RESPIRATION RATE: 18 BRPM | WEIGHT: 155 LBS | HEIGHT: 69 IN | DIASTOLIC BLOOD PRESSURE: 85 MMHG | HEART RATE: 61 BPM | SYSTOLIC BLOOD PRESSURE: 135 MMHG | BODY MASS INDEX: 22.96 KG/M2 | OXYGEN SATURATION: 96 %

## 2024-07-02 PROCEDURE — 99214 OFFICE O/P EST MOD 30 MIN: CPT

## 2024-09-04 ENCOUNTER — APPOINTMENT (OUTPATIENT)
Dept: VASCULAR SURGERY | Facility: CLINIC | Age: 54
End: 2024-09-04